# Patient Record
Sex: FEMALE | Race: WHITE | NOT HISPANIC OR LATINO | Employment: PART TIME | URBAN - NONMETROPOLITAN AREA
[De-identification: names, ages, dates, MRNs, and addresses within clinical notes are randomized per-mention and may not be internally consistent; named-entity substitution may affect disease eponyms.]

---

## 2019-05-07 ENCOUNTER — OFFICE VISIT (OUTPATIENT)
Dept: FAMILY MEDICINE CLINIC | Facility: CLINIC | Age: 24
End: 2019-05-07

## 2019-05-07 VITALS
DIASTOLIC BLOOD PRESSURE: 62 MMHG | HEART RATE: 110 BPM | SYSTOLIC BLOOD PRESSURE: 116 MMHG | OXYGEN SATURATION: 96 % | HEIGHT: 66 IN | BODY MASS INDEX: 20.39 KG/M2 | RESPIRATION RATE: 16 BRPM | TEMPERATURE: 97 F | WEIGHT: 126.9 LBS

## 2019-05-07 DIAGNOSIS — Z13.220 SCREENING FOR HYPERLIPIDEMIA: ICD-10-CM

## 2019-05-07 DIAGNOSIS — K59.09 CHRONIC CONSTIPATION: Primary | ICD-10-CM

## 2019-05-07 DIAGNOSIS — Z13.29 SCREENING FOR THYROID DISORDER: ICD-10-CM

## 2019-05-07 DIAGNOSIS — Z79.899 LONG-TERM USE OF HIGH-RISK MEDICATION: ICD-10-CM

## 2019-05-07 PROCEDURE — 99213 OFFICE O/P EST LOW 20 MIN: CPT | Performed by: NURSE PRACTITIONER

## 2019-05-07 RX ORDER — POLYETHYLENE GLYCOL 3350 17 G/17G
17 POWDER, FOR SOLUTION ORAL DAILY
Qty: 765 G | Refills: 11 | Status: SHIPPED | OUTPATIENT
Start: 2019-05-07 | End: 2019-07-08

## 2019-05-07 NOTE — PROGRESS NOTES
Chief Complaint   Patient presents with   • Establish Care     Bowel issues     Subjective   Kate Baeza is a 23 y.o. female who presents to the office to establish care and for current issues with bowels.    The following portions of the patient's history were reviewed and updated as appropriate: allergies, current medications, past family history, past medical history, past social history, past surgical history and problem list.    Migraine    This is a chronic problem. The current episode started more than 1 year ago. Episode frequency: 4 times weekly. The pain is located in the retro-orbital and bilateral region. The pain quality is similar to prior headaches. The quality of the pain is described as sharp, stabbing and band-like. The pain is at a severity of 0/10. The pain is moderate. Associated symptoms include eye pain, nausea, phonophobia, photophobia and vomiting. Pertinent negatives include no coughing, fever, rhinorrhea or scalp tenderness. The symptoms are aggravated by weather changes, bright light and caffeine withdrawal (certain smells). She has tried acetaminophen, beta blockers, darkened room, Excedrin and NSAIDs for the symptoms. The treatment provided no relief. Her past medical history is significant for migraine headaches and migraines in the family.    Previously worked up by neurology in [place] - Dr Shabana Bennett. Failed topirimate. Cannot remember other meds she has tried and failed, but nothing he tried helpd so she quit going to him. Will send for records and she will consider further treatment and referral to headache clinic.     Past Medical History:   Diagnosis Date   • Allergic    • Anemia    • Anxiety    • Asthma    • Headache    • Substance abuse (CMS/[facility])           Family History   Problem Relation Age of Onset   • Hyperlipidemia Mother    • Depression Mother    • Cancer Maternal Grandmother    • COPD Maternal Grandmother         Review of Systems   Constitutional: Negative.   "Negative for fever and unexpected weight change.   HENT: Negative for rhinorrhea.    Eyes: Positive for photophobia and pain.   Respiratory: Negative.  Negative for cough, chest tightness and shortness of breath.    Cardiovascular: Negative.  Negative for chest pain.   Gastrointestinal: Positive for nausea and vomiting.   Endocrine: Negative.    Genitourinary: Negative.  Negative for dysuria.   Musculoskeletal: Negative.    Skin: Negative.  Negative for color change, pallor, rash and wound.   Allergic/Immunologic: Negative.    Neurological: Positive for headaches (migranes x 8 years, prev seen by neurology).   Hematological: Negative.    Psychiatric/Behavioral: Negative.  Negative for sleep disturbance and suicidal ideas.   All other systems reviewed and are negative.      Objective   Vitals:    05/07/19 1406   BP: 116/62   BP Location: Left arm   Patient Position: Sitting   Cuff Size: Adult   Pulse: 110   Resp: 16   Temp: 97 °F (36.1 °C)   SpO2: 96%   Weight: 57.6 kg (126 lb 14.4 oz)   Height: 167.6 cm (66\")   PainSc: 0-No pain     Physical Exam   Constitutional: She is oriented to person, place, and time. She appears well-developed and well-nourished.   HENT:   Head: Normocephalic and atraumatic.   Eyes: Conjunctivae are normal. Pupils are equal, round, and reactive to light.   Neck: Normal range of motion. Neck supple.   Cardiovascular: Normal rate, regular rhythm, normal heart sounds and intact distal pulses. Exam reveals no gallop and no friction rub.   No murmur heard.  Pulmonary/Chest: Effort normal and breath sounds normal. No respiratory distress. She has no wheezes. She has no rales. She exhibits no tenderness.   Abdominal: Soft. Bowel sounds are normal.   Musculoskeletal: Normal range of motion. She exhibits no edema, tenderness or deformity.   Lymphadenopathy:     She has no cervical adenopathy.   Neurological: She is alert and oriented to person, place, and time.   Skin: Skin is warm and dry. Capillary " refill takes 2 to 3 seconds. No erythema. No pallor.   Psychiatric: She has a normal mood and affect. Her behavior is normal. Judgment and thought content normal.   Nursing note and vitals reviewed.      Assessment/Plan   Kate was seen today for establish care.    Diagnoses and all orders for this visit:    Chronic constipation    Other orders  -     polyethylene glycol (MIRALAX) powder; Take 17 g by mouth Daily. Mix in 8 oz liquid daily           No flowsheet data found.    KARY Miller         Return in about 2 years (around 5/7/2021).    There are no Patient Instructions on file for this visit.

## 2019-05-17 ENCOUNTER — TELEPHONE (OUTPATIENT)
Dept: FAMILY MEDICINE CLINIC | Facility: CLINIC | Age: 24
End: 2019-05-17

## 2019-05-17 LAB
ALBUMIN SERPL-MCNC: 4.6 G/DL (ref 3.5–5)
ALBUMIN/GLOB SERPL: 1.4 G/DL (ref 1.1–1.8)
ALP SERPL-CCNC: 48 U/L (ref 38–126)
ALT SERPL W P-5'-P-CCNC: 14 U/L
ANION GAP SERPL CALCULATED.3IONS-SCNC: 12 MMOL/L (ref 5–15)
AST SERPL-CCNC: 16 U/L (ref 14–36)
BASOPHILS # BLD AUTO: 0.03 10*3/MM3 (ref 0–0.2)
BASOPHILS NFR BLD AUTO: 0.3 % (ref 0–1.5)
BILIRUB SERPL-MCNC: 0.8 MG/DL (ref 0.2–1.3)
BUN BLD-MCNC: 9 MG/DL (ref 7–23)
BUN/CREAT SERPL: 17 (ref 7–25)
CALCIUM SPEC-SCNC: 9.8 MG/DL (ref 8.4–10.2)
CHLORIDE SERPL-SCNC: 104 MMOL/L (ref 101–112)
CHOLEST SERPL-MCNC: 143 MG/DL (ref 150–200)
CO2 SERPL-SCNC: 25 MMOL/L (ref 22–30)
CREAT BLD-MCNC: 0.53 MG/DL (ref 0.52–1.04)
DEPRECATED RDW RBC AUTO: 42.1 FL (ref 37–54)
EOSINOPHIL # BLD AUTO: 0.16 10*3/MM3 (ref 0–0.4)
EOSINOPHIL NFR BLD AUTO: 1.8 % (ref 0.3–6.2)
ERYTHROCYTE [DISTWIDTH] IN BLOOD BY AUTOMATED COUNT: 13.2 % (ref 12.3–15.4)
GFR SERPL CREATININE-BSD FRML MDRD: 143 ML/MIN/1.73 (ref 71–165)
GLOBULIN UR ELPH-MCNC: 3.3 GM/DL (ref 2.3–3.5)
GLUCOSE BLD-MCNC: 94 MG/DL (ref 70–99)
HCT VFR BLD AUTO: 38.4 % (ref 34–46.6)
HDLC SERPL-MCNC: 45 MG/DL (ref 40–59)
HGB BLD-MCNC: 12.9 G/DL (ref 12–15.9)
LDLC SERPL CALC-MCNC: 88 MG/DL
LDLC/HDLC SERPL: 1.95 {RATIO} (ref 0–3.22)
LYMPHOCYTES # BLD AUTO: 2.33 10*3/MM3 (ref 0.7–3.1)
LYMPHOCYTES NFR BLD AUTO: 25.6 % (ref 19.6–45.3)
MCH RBC QN AUTO: 29.7 PG (ref 26.6–33)
MCHC RBC AUTO-ENTMCNC: 33.6 G/DL (ref 31.5–35.7)
MCV RBC AUTO: 88.5 FL (ref 79–97)
MONOCYTES # BLD AUTO: 1.08 10*3/MM3 (ref 0.1–0.9)
MONOCYTES NFR BLD AUTO: 11.9 % (ref 5–12)
NEUTROPHILS # BLD AUTO: 5.49 10*3/MM3 (ref 1.7–7)
NEUTROPHILS NFR BLD AUTO: 60.4 % (ref 42.7–76)
PLATELET # BLD AUTO: 237 10*3/MM3 (ref 140–450)
PMV BLD AUTO: 10.3 FL (ref 6–12)
POTASSIUM BLD-SCNC: 4.2 MMOL/L (ref 3.4–5)
PROT SERPL-MCNC: 7.9 G/DL (ref 6.3–8.6)
RBC # BLD AUTO: 4.34 10*6/MM3 (ref 3.77–5.28)
SODIUM BLD-SCNC: 141 MMOL/L (ref 137–145)
TRIGL SERPL-MCNC: 51 MG/DL
VLDLC SERPL-MCNC: 10.2 MG/DL
WBC NRBC COR # BLD: 9.09 10*3/MM3 (ref 3.4–10.8)

## 2019-05-17 PROCEDURE — 80053 COMPREHEN METABOLIC PANEL: CPT | Performed by: NURSE PRACTITIONER

## 2019-05-17 PROCEDURE — 84439 ASSAY OF FREE THYROXINE: CPT | Performed by: NURSE PRACTITIONER

## 2019-05-17 PROCEDURE — 84443 ASSAY THYROID STIM HORMONE: CPT | Performed by: NURSE PRACTITIONER

## 2019-05-17 PROCEDURE — 85025 COMPLETE CBC W/AUTO DIFF WBC: CPT | Performed by: NURSE PRACTITIONER

## 2019-05-17 PROCEDURE — 84480 ASSAY TRIIODOTHYRONINE (T3): CPT | Performed by: NURSE PRACTITIONER

## 2019-05-17 PROCEDURE — 80061 LIPID PANEL: CPT | Performed by: NURSE PRACTITIONER

## 2019-05-17 NOTE — TELEPHONE ENCOUNTER
----- Message from KARY Talamantes sent at 5/17/2019 11:42 AM CDT -----  Notify pt of normal labs, though the thyroid function tests are still pending, thanks christopher

## 2019-05-18 LAB
T3 SERPL-MCNC: 144 NG/DL (ref 80–200)
T4 FREE SERPL-MCNC: 1.46 NG/DL (ref 0.93–1.7)
TSH SERPL DL<=0.05 MIU/L-ACNC: 0.88 MIU/ML (ref 0.27–4.2)

## 2019-05-24 ENCOUNTER — OFFICE VISIT (OUTPATIENT)
Dept: FAMILY MEDICINE CLINIC | Facility: CLINIC | Age: 24
End: 2019-05-24

## 2019-05-24 VITALS
DIASTOLIC BLOOD PRESSURE: 68 MMHG | BODY MASS INDEX: 19.13 KG/M2 | HEIGHT: 66 IN | OXYGEN SATURATION: 98 % | WEIGHT: 119 LBS | HEART RATE: 100 BPM | RESPIRATION RATE: 16 BRPM | SYSTOLIC BLOOD PRESSURE: 114 MMHG

## 2019-05-24 DIAGNOSIS — G43.709 CHRONIC MIGRAINE WITHOUT AURA WITHOUT STATUS MIGRAINOSUS, NOT INTRACTABLE: ICD-10-CM

## 2019-05-24 DIAGNOSIS — K59.09 CHRONIC CONSTIPATION: Primary | ICD-10-CM

## 2019-05-24 PROCEDURE — 99213 OFFICE O/P EST LOW 20 MIN: CPT | Performed by: NURSE PRACTITIONER

## 2019-05-24 RX ORDER — AMITRIPTYLINE HYDROCHLORIDE 25 MG/1
25 TABLET, FILM COATED ORAL NIGHTLY
Qty: 30 TABLET | Refills: 5 | Status: SHIPPED | OUTPATIENT
Start: 2019-05-24 | End: 2019-07-02

## 2019-05-24 RX ORDER — RIZATRIPTAN BENZOATE 10 MG/1
10 TABLET ORAL ONCE AS NEEDED
Qty: 30 TABLET | Refills: 3 | Status: SHIPPED | OUTPATIENT
Start: 2019-05-24 | End: 2019-07-02

## 2019-05-24 RX ORDER — CLONIDINE HYDROCHLORIDE 0.1 MG/1
0.1 TABLET ORAL NIGHTLY
Qty: 30 TABLET | Refills: 5 | Status: SHIPPED | OUTPATIENT
Start: 2019-05-24 | End: 2019-07-02

## 2019-05-24 NOTE — PROGRESS NOTES
"Chief Complaint   Patient presents with   • Constipation     2 week f/u     Subjective   Kate Baeza is a 23 y.o. female who presents to the office for follow up of constipation and migraine.    The following portions of the patient's history were reviewed and updated as appropriate: allergies, current medications, past family history, past medical history, past social history, past surgical history and problem list.    Constipation  Has previously tried laxatives, stool softeners, fiber with no improvement in symptoms. Has tried dietary changes and increasing fluid intake. She does take Suboxone but states that there was no change in symptoms with that. Started on Miralax on 5/7/19, taking twice daily. States that she is now moving her bowels twice a day with a \"normal\" bowel movement every couple days.  States that she now has some loose stool when she urinates. She has been taking the Miralax twice a day every day.  Encouraged to reduce frequency of Miralax to once a day or every other day.  Offered referral to GI, states she would like to hold off at this time and see how she does with Miralax for at least a month.       Migraine    This is a chronic problem. The current episode started more than 1 year ago. Episode frequency: 4 times weekly. The pain is located in the retro-orbital and bilateral region. The pain quality is similar to prior headaches. The quality of the pain is described as sharp, stabbing and band-like. The pain is at a severity of 0/10. The pain is moderate. Associated symptoms include eye pain, nausea, phonophobia, photophobia and vomiting. Pertinent negatives include no coughing, fever, rhinorrhea or scalp tenderness. The symptoms are aggravated by weather changes, bright light and caffeine withdrawal (certain smells). She has tried acetaminophen, beta blockers, darkened room, Excedrin and NSAIDs for the symptoms. The treatment provided no relief. Her past medical history is significant " "for migraine headaches and migraines in the family.    Previously worked up by neurology in [place] - Dr Shabana Bennett. Failed topirimate. Cannot remember other meds she has tried and failed, but nothing he tried helpd so she quit going to him. Will send for records and she will consider further treatment and referral to headache clinic.      Past Medical History:   Diagnosis Date   • Allergic    • Anemia    • Anxiety    • Asthma    • Headache    • Substance abuse (CMS/HCC)           Family History   Problem Relation Age of Onset   • Hyperlipidemia Mother    • Depression Mother    • Cancer Maternal Grandmother    • COPD Maternal Grandmother         Review of Systems   Constitutional: Negative.  Negative for fever and unexpected weight change.   Eyes: Negative.    Respiratory: Negative.  Negative for cough, chest tightness and shortness of breath.    Cardiovascular: Negative.  Negative for chest pain.   Gastrointestinal: Positive for abdominal pain (\"bubbling\" since starting Miralax), constipation (chronic), diarrhea (since initiation of Miralax) and vomiting (once, yesterday).   Endocrine: Negative.    Genitourinary: Negative.  Negative for dysuria.   Musculoskeletal: Negative.    Skin: Negative.  Negative for color change, pallor, rash and wound.   Allergic/Immunologic: Negative.    Neurological: Positive for headaches.   Hematological: Negative.    Psychiatric/Behavioral: Negative.  Negative for sleep disturbance and suicidal ideas.       Objective   Vitals:    05/24/19 1435   BP: 114/68   Pulse: 100   Resp: 16   SpO2: 98%   Weight: 54 kg (119 lb)   Height: 167.6 cm (66\")     Physical Exam   Constitutional: She is oriented to person, place, and time. She appears well-developed and well-nourished.   HENT:   Head: Normocephalic and atraumatic.   Eyes: Conjunctivae are normal. Pupils are equal, round, and reactive to light.   Neck: Normal range of motion. Neck supple.   Cardiovascular: Normal rate, regular " rhythm, normal heart sounds and intact distal pulses. Exam reveals no gallop and no friction rub.   No murmur heard.  Pulmonary/Chest: Effort normal and breath sounds normal. No respiratory distress. She has no wheezes. She has no rales. She exhibits no tenderness.   Abdominal: Soft. Bowel sounds are normal.   Musculoskeletal: Normal range of motion. She exhibits no edema, tenderness or deformity.   Lymphadenopathy:     She has no cervical adenopathy.   Neurological: She is alert and oriented to person, place, and time.   Skin: Skin is warm and dry. Capillary refill takes 2 to 3 seconds. No erythema. No pallor.   Psychiatric: She has a normal mood and affect. Her behavior is normal. Judgment and thought content normal.   Nursing note and vitals reviewed.      Assessment/Plan   Kate was seen today for constipation.    Diagnoses and all orders for this visit:    Chronic constipation  Comments:  Started on Miralax on 5/7/19 with resolution of constipation. Declines referral to GI this date.    Chronic migraine without aura without status migrainosus, not intractable  -     amitriptyline (ELAVIL) 25 MG tablet; Take 1 tablet by mouth Every Night. For chronic headache prevention  -     rizatriptan (MAXALT) 10 MG tablet; Take 1 tablet by mouth 1 (One) Time As Needed for Migraine for up to 1 dose. May repeat in 2 hours if needed max 30mg in 24 hours  -     CloNIDine (CATAPRES) 0.1 MG tablet; Take 1 tablet by mouth Every Night. For sleep/ headaches/ anxiety           No flowsheet data found.    KARY Miller         Return in about 4 weeks (around 6/21/2019).    Patient Instructions   Reduce Miralax to once a day. Hold a dose if you are having diarrhea or frequent loose stool.       Office Visit on 05/07/2019   Component Date Value Ref Range Status   • Glucose 05/17/2019 94  70 - 99 mg/dL Final   • BUN 05/17/2019 9  7 - 23 mg/dL Final   • Creatinine 05/17/2019 0.53  0.52 - 1.04 mg/dL Final   • Sodium 05/17/2019 141   137 - 145 mmol/L Final   • Potassium 05/17/2019 4.2  3.4 - 5.0 mmol/L Final   • Chloride 05/17/2019 104  101 - 112 mmol/L Final   • CO2 05/17/2019 25.0  22.0 - 30.0 mmol/L Final   • Calcium 05/17/2019 9.8  8.4 - 10.2 mg/dL Final   • Total Protein 05/17/2019 7.9  6.3 - 8.6 g/dL Final   • Albumin 05/17/2019 4.60  3.50 - 5.00 g/dL Final   • ALT (SGPT) 05/17/2019 14  <=35 U/L Final   • AST (SGOT) 05/17/2019 16  14 - 36 U/L Final   • Alkaline Phosphatase 05/17/2019 48  38 - 126 U/L Final   • Total Bilirubin 05/17/2019 0.8  0.2 - 1.3 mg/dL Final   • eGFR Non African Amer 05/17/2019 143  71 - 165 mL/min/1.73 Final   • Globulin 05/17/2019 3.3  2.3 - 3.5 gm/dL Final   • A/G Ratio 05/17/2019 1.4  1.1 - 1.8 g/dL Final   • BUN/Creatinine Ratio 05/17/2019 17.0  7.0 - 25.0 Final   • Anion Gap 05/17/2019 12.0  5.0 - 15.0 mmol/L Final   • Total Cholesterol 05/17/2019 143* 150 - 200 mg/dL Final   • Triglycerides 05/17/2019 51  <=150 mg/dL Final   • HDL Cholesterol 05/17/2019 45  40 - 59 mg/dL Final   • LDL Cholesterol  05/17/2019 88  <=100 mg/dL Final   • VLDL Cholesterol 05/17/2019 10.2  mg/dL Final   • LDL/HDL Ratio 05/17/2019 1.95  0.00 - 3.22 Final   • Free T4 05/17/2019 1.46  0.93 - 1.70 ng/dL Final   • TSH 05/17/2019 0.876  0.270 - 4.200 mIU/mL Final   • T3, Total 05/17/2019 144.0  80.0 - 200.0 ng/dl Final   • WBC 05/17/2019 9.09  3.40 - 10.80 10*3/mm3 Final   • RBC 05/17/2019 4.34  3.77 - 5.28 10*6/mm3 Final   • Hemoglobin 05/17/2019 12.9  12.0 - 15.9 g/dL Final   • Hematocrit 05/17/2019 38.4  34.0 - 46.6 % Final   • MCV 05/17/2019 88.5  79.0 - 97.0 fL Final   • MCH 05/17/2019 29.7  26.6 - 33.0 pg Final   • MCHC 05/17/2019 33.6  31.5 - 35.7 g/dL Final   • RDW 05/17/2019 13.2  12.3 - 15.4 % Final   • RDW-SD 05/17/2019 42.1  37.0 - 54.0 fl Final   • MPV 05/17/2019 10.3  6.0 - 12.0 fL Final   • Platelets 05/17/2019 237  140 - 450 10*3/mm3 Final   • Neutrophil % 05/17/2019 60.4  42.7 - 76.0 % Final   • Lymphocyte % 05/17/2019  25.6  19.6 - 45.3 % Final   • Monocyte % 05/17/2019 11.9  5.0 - 12.0 % Final   • Eosinophil % 05/17/2019 1.8  0.3 - 6.2 % Final   • Basophil % 05/17/2019 0.3  0.0 - 1.5 % Final   • Neutrophils, Absolute 05/17/2019 5.49  1.70 - 7.00 10*3/mm3 Final   • Lymphocytes, Absolute 05/17/2019 2.33  0.70 - 3.10 10*3/mm3 Final   • Monocytes, Absolute 05/17/2019 1.08* 0.10 - 0.90 10*3/mm3 Final   • Eosinophils, Absolute 05/17/2019 0.16  0.00 - 0.40 10*3/mm3 Final   • Basophils, Absolute 05/17/2019 0.03  0.00 - 0.20 10*3/mm3 Final   ]

## 2019-07-01 ENCOUNTER — INITIAL PRENATAL (OUTPATIENT)
Dept: OBSTETRICS AND GYNECOLOGY | Facility: CLINIC | Age: 24
End: 2019-07-01

## 2019-07-01 ENCOUNTER — APPOINTMENT (OUTPATIENT)
Dept: LAB | Facility: HOSPITAL | Age: 24
End: 2019-07-01

## 2019-07-01 VITALS — BODY MASS INDEX: 20.14 KG/M2 | DIASTOLIC BLOOD PRESSURE: 68 MMHG | WEIGHT: 121 LBS | SYSTOLIC BLOOD PRESSURE: 102 MMHG

## 2019-07-01 DIAGNOSIS — Z3A.12 12 WEEKS GESTATION OF PREGNANCY: Primary | ICD-10-CM

## 2019-07-01 DIAGNOSIS — F19.91 HISTORY OF DRUG USE: ICD-10-CM

## 2019-07-01 DIAGNOSIS — K59.00 CONSTIPATION IN PREGNANCY IN FIRST TRIMESTER: ICD-10-CM

## 2019-07-01 DIAGNOSIS — O99.611 CONSTIPATION IN PREGNANCY IN FIRST TRIMESTER: ICD-10-CM

## 2019-07-01 DIAGNOSIS — Z86.69 HISTORY OF MIGRAINE HEADACHES: ICD-10-CM

## 2019-07-01 DIAGNOSIS — Z36.87 ENCOUNTER FOR ANTENATAL SCREENING FOR UNCERTAIN DATES: ICD-10-CM

## 2019-07-01 DIAGNOSIS — F17.210 CIGARETTE SMOKER: ICD-10-CM

## 2019-07-01 DIAGNOSIS — R11.0 NAUSEA: ICD-10-CM

## 2019-07-01 DIAGNOSIS — Z34.80 PRENATAL CARE OF MULTIGRAVIDA, ANTEPARTUM: Primary | ICD-10-CM

## 2019-07-01 DIAGNOSIS — F19.11 HX OF SUBSTANCE ABUSE (HCC): ICD-10-CM

## 2019-07-01 DIAGNOSIS — R42 DIZZY: ICD-10-CM

## 2019-07-01 DIAGNOSIS — O09.299 HISTORY OF MISCARRIAGE, CURRENTLY PREGNANT: ICD-10-CM

## 2019-07-01 DIAGNOSIS — O99.331 MATERNAL TOBACCO USE IN FIRST TRIMESTER: ICD-10-CM

## 2019-07-01 LAB
ABO GROUP BLD: NORMAL
AMPHET+METHAMPHET UR QL: POSITIVE
BARBITURATES UR QL SCN: NEGATIVE
BASOPHILS # BLD AUTO: 0.05 10*3/MM3 (ref 0–0.2)
BASOPHILS NFR BLD AUTO: 0.6 % (ref 0–1.5)
BENZODIAZ UR QL SCN: NEGATIVE
BILIRUB UR QL STRIP: NEGATIVE
BLD GP AB SCN SERPL QL: NEGATIVE
CANNABINOIDS SERPL QL: NEGATIVE
CLARITY UR: ABNORMAL
COCAINE UR QL: NEGATIVE
COLOR UR: ABNORMAL
DEPRECATED RDW RBC AUTO: 41.5 FL (ref 37–54)
EOSINOPHIL # BLD AUTO: 0.14 10*3/MM3 (ref 0–0.4)
EOSINOPHIL NFR BLD AUTO: 1.5 % (ref 0.3–6.2)
ERYTHROCYTE [DISTWIDTH] IN BLOOD BY AUTOMATED COUNT: 13 % (ref 12.3–15.4)
GLUCOSE UR STRIP-MCNC: NEGATIVE MG/DL
HBV SURFACE AG SERPL QL IA: NORMAL
HCT VFR BLD AUTO: 35.7 % (ref 34–46.6)
HCV AB SER DONR QL: NORMAL
HGB BLD-MCNC: 12.1 G/DL (ref 12–15.9)
HGB UR QL STRIP.AUTO: NEGATIVE
HIV1+2 AB SER QL: NORMAL
IMM GRANULOCYTES # BLD AUTO: 0.05 10*3/MM3 (ref 0–0.05)
IMM GRANULOCYTES NFR BLD AUTO: 0.6 % (ref 0–0.5)
KETONES UR QL STRIP: ABNORMAL
LEUKOCYTE ESTERASE UR QL STRIP.AUTO: ABNORMAL
LYMPHOCYTES # BLD AUTO: 1.55 10*3/MM3 (ref 0.7–3.1)
LYMPHOCYTES NFR BLD AUTO: 17.1 % (ref 19.6–45.3)
Lab: NORMAL
MCH RBC QN AUTO: 29.8 PG (ref 26.6–33)
MCHC RBC AUTO-ENTMCNC: 33.9 G/DL (ref 31.5–35.7)
MCV RBC AUTO: 87.9 FL (ref 79–97)
METHADONE UR QL SCN: NEGATIVE
MONOCYTES # BLD AUTO: 0.73 10*3/MM3 (ref 0.1–0.9)
MONOCYTES NFR BLD AUTO: 8.1 % (ref 5–12)
NEUTROPHILS # BLD AUTO: 6.52 10*3/MM3 (ref 1.7–7)
NEUTROPHILS NFR BLD AUTO: 72.1 % (ref 42.7–76)
NITRITE UR QL STRIP: NEGATIVE
NRBC BLD AUTO-RTO: 0 /100 WBC (ref 0–0.2)
OPIATES UR QL: NEGATIVE
OXYCODONE UR QL SCN: NEGATIVE
PH UR STRIP.AUTO: 6.5 [PH] (ref 5–8)
PLATELET # BLD AUTO: 200 10*3/MM3 (ref 140–450)
PMV BLD AUTO: 10.3 FL (ref 6–12)
PROT UR QL STRIP: ABNORMAL
RBC # BLD AUTO: 4.06 10*6/MM3 (ref 3.77–5.28)
RH BLD: POSITIVE
RPR SER QL: NORMAL
RUBV IGG SERPL IA-ACNC: NORMAL
SP GR UR STRIP: >1.03 (ref 1–1.03)
UROBILINOGEN UR QL STRIP: ABNORMAL
WBC NRBC COR # BLD: 9.04 10*3/MM3 (ref 3.4–10.8)

## 2019-07-01 PROCEDURE — 87086 URINE CULTURE/COLONY COUNT: CPT | Performed by: NURSE PRACTITIONER

## 2019-07-01 PROCEDURE — 99214 OFFICE O/P EST MOD 30 MIN: CPT | Performed by: NURSE PRACTITIONER

## 2019-07-01 PROCEDURE — 80307 DRUG TEST PRSMV CHEM ANLYZR: CPT | Performed by: NURSE PRACTITIONER

## 2019-07-01 PROCEDURE — 81003 URINALYSIS AUTO W/O SCOPE: CPT | Performed by: NURSE PRACTITIONER

## 2019-07-01 PROCEDURE — 87661 TRICHOMONAS VAGINALIS AMPLIF: CPT | Performed by: NURSE PRACTITIONER

## 2019-07-01 PROCEDURE — 36415 COLL VENOUS BLD VENIPUNCTURE: CPT

## 2019-07-01 PROCEDURE — 87591 N.GONORRHOEAE DNA AMP PROB: CPT | Performed by: NURSE PRACTITIONER

## 2019-07-01 PROCEDURE — 87491 CHLMYD TRACH DNA AMP PROBE: CPT | Performed by: NURSE PRACTITIONER

## 2019-07-01 PROCEDURE — 86803 HEPATITIS C AB TEST: CPT | Performed by: NURSE PRACTITIONER

## 2019-07-01 PROCEDURE — 80081 OBSTETRIC PANEL INC HIV TSTG: CPT | Performed by: NURSE PRACTITIONER

## 2019-07-01 RX ORDER — DOCUSATE SODIUM 100 MG/1
100 CAPSULE, LIQUID FILLED ORAL 2 TIMES DAILY
Qty: 60 CAPSULE | Refills: 1 | Status: SHIPPED | OUTPATIENT
Start: 2019-07-01 | End: 2019-12-17

## 2019-07-01 RX ORDER — PRENATAL VIT/IRON FUM/FOLIC AC 27MG-0.8MG
TABLET ORAL DAILY
COMMUNITY
End: 2019-07-03 | Stop reason: SDUPTHER

## 2019-07-01 NOTE — PROGRESS NOTES
"I spent approximately 60 minutes with the patient acquiring the health and history intake and discussing topics related to healthy lifestyle. This is her 2nd pregnancy. She had a miscarriage a year ago. Patient has history of anemia and constipation. A paper is given on anemia and on fiber. We discussed options to try for constipation because patient states she does not take anything daily. She says when she cant \"use the bathroom\" she takes miralax. We discussed fiber supplements, stool softeners, water, added fiber in diet, etc. The 1st trimester teaching was done with the patient. We discussed a healthy diet and exercise and what is recommended. I also discussed Listeriosis and Toxoplasmosis and what fish to avoid due to high mercury levels. Informed patient not to be in hot tubs, saunas, or tanning beds. We discussed that spotting may occur after intercourse which is common, but if heavy bleeding like a period occurs to call the [facility] or hospital if clinic is closed.  I encouraged her to make an appointment with the dentist if she has not had a dental exam and cleaning in the last 6 months. A dental letter is given. I  instructed the patient that alcohol, illicit drug use, and tobacco smoking should be avoided in pregnancy. The patient does smoke but trying to quit. She is currently smoking 2-3 cigarettes per day. She has a history of substance abuse. She is currently on suboxone and is seeing Dr. Sagastume at the [facility]. We discussed the hospital policy procedure if a patient has a positive urine drug screen at the time of admission to the hospital. She is unsure if she plans to breastfeed due to medicine she is currently taking. She says it depends on if she is taking suboxone and smoking cigarettes when she delivers. I gave her pamphlet on breastfeeding classes and the breastfeeding mothers support group that is provided by Blossom Desai, Lactation Consultant. We discussed the resources that is " offered at the health departments, and we discussed that some health departments have a breastfeeding peer counselor. I gave her WIC and HANDS pamphlets as well as address and phone number of [facility]. She filled out health dept. Referral form.  I discussed lab tests will be done today. The Quad screen is discussed and informed patient it is offered at 15-20 weeks and is optional. She states she has never had a pap smear.   I encouraged the patient to get the TDAP vaccine in the 3rd trimester. I told the patient that health departments are giving the TDAP vaccine to family members. All questions were answered at this time.

## 2019-07-02 DIAGNOSIS — R82.5 POSITIVE URINE DRUG SCREEN: Primary | ICD-10-CM

## 2019-07-02 LAB
BACTERIA SPEC AEROBE CULT: NO GROWTH
C TRACH RRNA CVX QL NAA+PROBE: NEGATIVE
N GONORRHOEA RRNA SPEC QL NAA+PROBE: NEGATIVE
TRICHOMONAS VAGINALIS PCR: NEGATIVE

## 2019-07-02 NOTE — PROGRESS NOTES
CC: NOB visit, hx reviewed    HPI: Second pregnancy, history of early SAB in 2018.  Pt reports only current medical condition is migraine headaches.  She is taking suboxone daily for past history of substance abuse, but has weaned herself down.  SxHx: wisdom teeth extraction    ROS: +nausea, dizzy spells, constipation.  Pt denies hb, cramping, dysuria, or vaginal bleeding.      Labs:   No results found for: HGBA1C  Glucose   Date Value Ref Range Status   2019 94 70 - 99 mg/dL Final     Last Completed Pap Smear       Status Date      PAP SMEAR No completions recorded          Radiology: v scan utilized     Each of the above were reviewed and integrated into prenatal care plan.    P/E:  See Vitals flow sheet, see NOB physical in episode tab    A/P: 23 y.o. #: 1, Date: 2018, Sex: None, Weight: None, GA: None, Delivery: None, Apgar1: None, Apgar5: None, Living: None, Birth Comments: None    #: 2, Date: None, Sex: None, Weight: None, GA: 12w5d per stated LMP      1. Routine prenatal care   Encourage PNV   SAB precautions   NOB labs drawn- will go over results at next visit     2.    Diagnosis Plan   1. 12 weeks gestation of pregnancy     2. Encounter for  screening for uncertain dates  US Ob Transvaginal   3. Constipation in pregnancy in first trimester  psyllium (METAMUCIL SMOOTH TEXTURE) 28 % packet  Ensure pt is drinking at least 8 cups of water daily, eat fruits and veggies    docusate sodium (COLACE) 100 MG capsule   4. History of migraine headaches  No current medications, may take tylenol as needed   5. History of drug use  Pt currently taking suboxone, has an appointment with Dr. Sagastume and planning on switching to subutex    6. Maternal tobacco use in first trimester  Smokes 2-3 cigarettes/day.  Pt educated on risks of smoking in pregnancy.  Strongly encouraged complete cessation.     7. Nausea       Dry crackers before arising in am, small frequent protein         filled snacks, pt  declines need for medication at this time  8. Dizzy        Ensure pt is staying hydrated, change positions slowly     RTC in 1 week for NADJA nora and TVUS dating scan    At least 50% of this 25 minute pt encounter was spent counseling pt

## 2019-07-03 RX ORDER — PRENATAL VIT/IRON FUM/FOLIC AC 65 MG-1 MG
1 TABLET ORAL DAILY
Qty: 30 EACH | Refills: 12 | Status: SHIPPED | OUTPATIENT
Start: 2019-07-03 | End: 2020-01-09 | Stop reason: SDUPTHER

## 2019-07-08 ENCOUNTER — APPOINTMENT (OUTPATIENT)
Dept: LAB | Facility: HOSPITAL | Age: 24
End: 2019-07-08

## 2019-07-08 ENCOUNTER — ROUTINE PRENATAL (OUTPATIENT)
Dept: OBSTETRICS AND GYNECOLOGY | Facility: CLINIC | Age: 24
End: 2019-07-08

## 2019-07-08 VITALS — BODY MASS INDEX: 21.13 KG/M2 | DIASTOLIC BLOOD PRESSURE: 55 MMHG | WEIGHT: 127 LBS | SYSTOLIC BLOOD PRESSURE: 100 MMHG

## 2019-07-08 DIAGNOSIS — K59.00 CONSTIPATION IN PREGNANCY IN FIRST TRIMESTER: ICD-10-CM

## 2019-07-08 DIAGNOSIS — Z3A.10 10 WEEKS GESTATION OF PREGNANCY: Primary | ICD-10-CM

## 2019-07-08 DIAGNOSIS — F19.91 HISTORY OF DRUG USE: ICD-10-CM

## 2019-07-08 DIAGNOSIS — O99.331 MATERNAL TOBACCO USE IN FIRST TRIMESTER: ICD-10-CM

## 2019-07-08 DIAGNOSIS — O99.611 CONSTIPATION IN PREGNANCY IN FIRST TRIMESTER: ICD-10-CM

## 2019-07-08 PROCEDURE — 99213 OFFICE O/P EST LOW 20 MIN: CPT | Performed by: NURSE PRACTITIONER

## 2019-07-08 PROCEDURE — G0480 DRUG TEST DEF 1-7 CLASSES: HCPCS | Performed by: NURSE PRACTITIONER

## 2019-07-08 NOTE — PROGRESS NOTES
CC: NADJA visit, hx reviewed     HPI: Here for follow up prenatal care.     ROS: No complaints today.  Pt denies cramping, dysuria, or vaginal  bleeding.    Labs:   No results found for: HGBA1C  Glucose   Date Value Ref Range Status   05/17/2019 94 70 - 99 mg/dL Final     Last Completed Pap Smear       Status Date      PAP SMEAR No completions recorded          Radiology: Reviewed preliminary scan report with pt- single interuterine pregnancy, cervix wnl, right ovarian cyst measuring 4.89x3.87x4.0 cm    Each of the above were reviewed and integrated into prenatal care plan.    P/E:  See Vitals flow sheet    A/P: 23 y.o. #: 1, Date: 07/2018, Sex: None, Weight: None, GA: None, Delivery: None, Apgar1: None, Apgar5: None, Living: None, Birth Comments: None    #: 2, Date: None, Sex: None, Weight: None, GA:10w5d per today's dating scan       1. Routine prenatal care   Encourage PNV   SAB precautions   Reviewed NOB lab results with pt UDS + amp/meth, confirmatory test today     2.    Diagnosis Plan   1. 10 weeks gestation of pregnancy     2. History of drug use  On subutex, seeing Dr. Sagastume.  Pt reports she is clean.     3. Constipation in pregnancy in first trimester  Continue colace and metamucil daily.  Need to increase water intake to min 8 cups per day.     4. Tobacco use           Encourage complete cessation, pt aware of risks.    RTC in 4 weeks for NADJA appt or sooner if needed

## 2019-07-12 DIAGNOSIS — Z36.87 ENCOUNTER FOR ANTENATAL SCREENING FOR UNCERTAIN DATES: ICD-10-CM

## 2019-07-14 LAB
AMPHETAMINES UR QL: NEGATIVE
Lab: NORMAL

## 2019-08-05 ENCOUNTER — APPOINTMENT (OUTPATIENT)
Dept: LAB | Facility: HOSPITAL | Age: 24
End: 2019-08-05

## 2019-08-05 ENCOUNTER — ROUTINE PRENATAL (OUTPATIENT)
Dept: OBSTETRICS AND GYNECOLOGY | Facility: CLINIC | Age: 24
End: 2019-08-05

## 2019-08-05 VITALS — WEIGHT: 128 LBS | BODY MASS INDEX: 21.3 KG/M2 | SYSTOLIC BLOOD PRESSURE: 99 MMHG | DIASTOLIC BLOOD PRESSURE: 61 MMHG

## 2019-08-05 DIAGNOSIS — O99.612 CONSTIPATION IN PREGNANCY IN SECOND TRIMESTER: ICD-10-CM

## 2019-08-05 DIAGNOSIS — O99.332 MATERNAL TOBACCO USE IN SECOND TRIMESTER: ICD-10-CM

## 2019-08-05 DIAGNOSIS — Z3A.14 14 WEEKS GESTATION OF PREGNANCY: Primary | ICD-10-CM

## 2019-08-05 DIAGNOSIS — R10.9 ABDOMINAL CRAMPING AFFECTING PREGNANCY: ICD-10-CM

## 2019-08-05 DIAGNOSIS — Z36.89 ENCOUNTER FOR FETAL ANATOMIC SURVEY: ICD-10-CM

## 2019-08-05 DIAGNOSIS — O26.899 ABDOMINAL CRAMPING AFFECTING PREGNANCY: ICD-10-CM

## 2019-08-05 DIAGNOSIS — F19.91 HISTORY OF DRUG USE: ICD-10-CM

## 2019-08-05 DIAGNOSIS — K59.00 CONSTIPATION IN PREGNANCY IN SECOND TRIMESTER: ICD-10-CM

## 2019-08-05 LAB
BILIRUB UR QL STRIP: NEGATIVE
CANDIDA ALBICANS: NEGATIVE
CLARITY UR: ABNORMAL
COLOR UR: YELLOW
GARDNERELLA VAGINALIS: POSITIVE
GLUCOSE UR STRIP-MCNC: NEGATIVE MG/DL
HGB UR QL STRIP.AUTO: NEGATIVE
KETONES UR QL STRIP: NEGATIVE
LEUKOCYTE ESTERASE UR QL STRIP.AUTO: ABNORMAL
NITRITE UR QL STRIP: NEGATIVE
PH UR STRIP.AUTO: 6 [PH] (ref 5–8)
PROT UR QL STRIP: NEGATIVE
SP GR UR STRIP: 1.02 (ref 1–1.03)
T VAGINALIS DNA VAG QL PROBE+SIG AMP: NEGATIVE
UROBILINOGEN UR QL STRIP: ABNORMAL

## 2019-08-05 PROCEDURE — 87480 CANDIDA DNA DIR PROBE: CPT | Performed by: NURSE PRACTITIONER

## 2019-08-05 PROCEDURE — 87510 GARDNER VAG DNA DIR PROBE: CPT | Performed by: NURSE PRACTITIONER

## 2019-08-05 PROCEDURE — 87660 TRICHOMONAS VAGIN DIR PROBE: CPT | Performed by: NURSE PRACTITIONER

## 2019-08-05 PROCEDURE — 99213 OFFICE O/P EST LOW 20 MIN: CPT | Performed by: NURSE PRACTITIONER

## 2019-08-05 PROCEDURE — 81003 URINALYSIS AUTO W/O SCOPE: CPT | Performed by: NURSE PRACTITIONER

## 2019-08-05 PROCEDURE — 87086 URINE CULTURE/COLONY COUNT: CPT | Performed by: NURSE PRACTITIONER

## 2019-08-05 NOTE — PROGRESS NOTES
CC: NADJA visit, hx reviewed     HPI: 23 y.o.   GA: 14w5d JIM: 2020, by Ultrasound  Here for follow up prenatal care.     ROS: +constipation, cramping.  Pt denies dysuria or vaginal bleeding.      Labs:   No results found for: HGBA1C  Glucose   Date Value Ref Range Status   2019 94 70 - 99 mg/dL Final     Last Completed Pap Smear       Status Date      PAP SMEAR No completions recorded        Each of the above were reviewed and integrated into prenatal care plan.    P/E:  See Vitals flow sheet    1. Routine prenatal care   Encourage PNV   PTL precautions     2.    Diagnosis Plan   1. 14 weeks gestation of pregnancy     2. Abdominal cramping affecting pregnancy  Urinalysis without microscopic (no culture) - Urine, Clean Catch    Urine Culture - Urine, Urine, Clean Catch    Gardnerella vaginalis, Trichomonas vaginalis, Candida albicans, DNA - Swab, Vagina   3. Maternal tobacco use in second trimester  Down to 1- 3 cigarettes per day   4. Encounter for fetal anatomic survey  US Ob Detail Fetal Anatomy Single or First Gestation   5. History of drug use  On subutex    6. Constipation in pregnancy in second trimester  Increase water intake, continue colace, daily metamucil, miralax prn   Increase fibrous food consumption     RTC in 4 weeks for NADJA appt and anatomy scan or sooner if needed

## 2019-08-06 LAB — BACTERIA SPEC AEROBE CULT: NO GROWTH

## 2019-08-06 RX ORDER — FLUCONAZOLE 150 MG/1
TABLET ORAL
Qty: 2 TABLET | Refills: 0 | Status: SHIPPED | OUTPATIENT
Start: 2019-08-06 | End: 2019-09-05

## 2019-08-06 RX ORDER — METRONIDAZOLE 500 MG/1
500 TABLET ORAL 2 TIMES DAILY
Qty: 14 TABLET | Refills: 0 | Status: SHIPPED | OUTPATIENT
Start: 2019-08-06 | End: 2019-08-13

## 2019-08-16 ENCOUNTER — PATIENT MESSAGE (OUTPATIENT)
Dept: FAMILY MEDICINE CLINIC | Facility: CLINIC | Age: 24
End: 2019-08-16

## 2019-09-05 ENCOUNTER — ROUTINE PRENATAL (OUTPATIENT)
Dept: OBSTETRICS AND GYNECOLOGY | Facility: CLINIC | Age: 24
End: 2019-09-05

## 2019-09-05 VITALS — BODY MASS INDEX: 21.8 KG/M2 | DIASTOLIC BLOOD PRESSURE: 57 MMHG | WEIGHT: 131 LBS | SYSTOLIC BLOOD PRESSURE: 99 MMHG

## 2019-09-05 DIAGNOSIS — F11.20 PREGNANCY COMPLICATED BY SUBUTEX MAINTENANCE, ANTEPARTUM (HCC): ICD-10-CM

## 2019-09-05 DIAGNOSIS — O99.320 PREGNANCY COMPLICATED BY SUBUTEX MAINTENANCE, ANTEPARTUM (HCC): ICD-10-CM

## 2019-09-05 DIAGNOSIS — IMO0002 EVALUATE ANATOMY NOT SEEN ON PRIOR SONOGRAM: ICD-10-CM

## 2019-09-05 DIAGNOSIS — O99.330 TOBACCO USE DURING PREGNANCY, ANTEPARTUM: ICD-10-CM

## 2019-09-05 DIAGNOSIS — K59.00 CONSTIPATION IN PREGNANCY IN SECOND TRIMESTER: ICD-10-CM

## 2019-09-05 DIAGNOSIS — Z3A.19 19 WEEKS GESTATION OF PREGNANCY: Primary | ICD-10-CM

## 2019-09-05 DIAGNOSIS — O99.612 CONSTIPATION IN PREGNANCY IN SECOND TRIMESTER: ICD-10-CM

## 2019-09-05 PROCEDURE — 99213 OFFICE O/P EST LOW 20 MIN: CPT | Performed by: NURSE PRACTITIONER

## 2019-09-05 RX ORDER — BUPRENORPHINE HYDROCHLORIDE 8 MG/1
TABLET SUBLINGUAL
COMMUNITY
Start: 2019-07-16 | End: 2019-09-05

## 2019-09-05 NOTE — PROGRESS NOTES
CC: Prenatal visit    Kate Baeza is a 24 y.o.  at 19w1d.  Overall pt is doing well, constipation remains an issue despite increased water intake and colace.  She has taken miralax which did help.  Denies contractions, LOF, or VB.  Reports good FM.    BP 99/57   Wt 59.4 kg (131 lb)   LMP 2019   BMI 21.80 kg/m²       FHT: 153 bpm      A/P: Kate Baeza is a 24 y.o.  at 19w1d.    - RTC in 3 weeks for NADJA appt and f/u [facility] for sub optimal views      Diagnosis Plan   1. 19 weeks gestation of pregnancy     2. Evaluate anatomy not seen on prior sonogram  US Ob Follow Up Transabdominal Approach   3. Constipation in pregnancy in second trimester  Increase water intake, continue colace and metamucil, take miralax as needed   4. Pregnancy complicated by subutex maintenance, antepartum (CMS/HCC)     5. Tobacco use during pregnancy, antepartum  Pt aware of risks, strongly encouraged complete smoking cessation        KARY Stokes  2019  12:55 PM

## 2019-09-09 DIAGNOSIS — Z36.89 ENCOUNTER FOR FETAL ANATOMIC SURVEY: ICD-10-CM

## 2019-09-26 ENCOUNTER — ROUTINE PRENATAL (OUTPATIENT)
Dept: OBSTETRICS AND GYNECOLOGY | Facility: CLINIC | Age: 24
End: 2019-09-26

## 2019-09-26 ENCOUNTER — APPOINTMENT (OUTPATIENT)
Dept: LAB | Facility: HOSPITAL | Age: 24
End: 2019-09-26

## 2019-09-26 VITALS — BODY MASS INDEX: 22.63 KG/M2 | DIASTOLIC BLOOD PRESSURE: 52 MMHG | WEIGHT: 136 LBS | SYSTOLIC BLOOD PRESSURE: 90 MMHG

## 2019-09-26 DIAGNOSIS — F11.20 PREGNANCY COMPLICATED BY SUBUTEX MAINTENANCE, ANTEPARTUM (HCC): ICD-10-CM

## 2019-09-26 DIAGNOSIS — Z3A.22 22 WEEKS GESTATION OF PREGNANCY: Primary | ICD-10-CM

## 2019-09-26 DIAGNOSIS — Z64.1 MULTIGRAVIDA: ICD-10-CM

## 2019-09-26 DIAGNOSIS — O99.332: ICD-10-CM

## 2019-09-26 DIAGNOSIS — R35.0 URINARY FREQUENCY: ICD-10-CM

## 2019-09-26 DIAGNOSIS — O99.320 PREGNANCY COMPLICATED BY SUBUTEX MAINTENANCE, ANTEPARTUM (HCC): ICD-10-CM

## 2019-09-26 LAB
BILIRUB UR QL STRIP: NEGATIVE
CLARITY UR: CLEAR
COLOR UR: ABNORMAL
GLUCOSE UR STRIP-MCNC: NEGATIVE MG/DL
HGB UR QL STRIP.AUTO: NEGATIVE
KETONES UR QL STRIP: ABNORMAL
LEUKOCYTE ESTERASE UR QL STRIP.AUTO: ABNORMAL
NITRITE UR QL STRIP: NEGATIVE
PH UR STRIP.AUTO: 6.5 [PH] (ref 5–8)
PROT UR QL STRIP: ABNORMAL
SP GR UR STRIP: 1.03 (ref 1–1.03)
UROBILINOGEN UR QL STRIP: ABNORMAL

## 2019-09-26 PROCEDURE — 81003 URINALYSIS AUTO W/O SCOPE: CPT | Performed by: NURSE PRACTITIONER

## 2019-09-26 PROCEDURE — 87086 URINE CULTURE/COLONY COUNT: CPT | Performed by: NURSE PRACTITIONER

## 2019-09-26 PROCEDURE — 99212 OFFICE O/P EST SF 10 MIN: CPT | Performed by: NURSE PRACTITIONER

## 2019-09-26 RX ORDER — BUPRENORPHINE HYDROCHLORIDE 8 MG/1
TABLET SUBLINGUAL
Refills: 0 | COMMUNITY
Start: 2019-09-23 | End: 2020-03-10

## 2019-09-26 NOTE — PROGRESS NOTES
CC: Prenatal visit    Kate Baeza is a 24 y.o.  at 22w1d.  Doing well.  Pt reports she is urinating 5x per hour for the past few days.  Denies contractions, LOF, or VB.  Reports good FM.    BP 90/52   Wt 61.7 kg (136 lb)   LMP 2019   BMI 22.63 kg/m²     Reviewed preliminary scan report with pt- placenta anterior, previous sub optimal views obtained, AFV WNL, CL 3.8cm  Fundal Height (cm): 22 cm  Fetal Heart Rate: 151 us           A/P: Kate Baeza is a 24 y.o.  at 22w1d.  - RTC in 4 weeks for NADJA appt or sooner if needed      Diagnosis Plan   1. 22 weeks gestation of pregnancy     2. Urinary frequency  Urinalysis without microscopic (no culture) - Urine, Clean Catch    Urine Culture - , Urine, Clean Catch   3. Pregnancy complicated by subutex maintenance, antepartum (CMS/HCC)  On subutex, sees Dr. Sagastume at [facility]        Antonieta Jumana, APRJEANNIE  2019  1:47 PM

## 2019-09-27 ENCOUNTER — TELEPHONE (OUTPATIENT)
Dept: OBSTETRICS AND GYNECOLOGY | Facility: CLINIC | Age: 24
End: 2019-09-27

## 2019-09-27 LAB — BACTERIA SPEC AEROBE CULT: NO GROWTH

## 2019-10-04 DIAGNOSIS — IMO0002 EVALUATE ANATOMY NOT SEEN ON PRIOR SONOGRAM: ICD-10-CM

## 2019-10-07 DIAGNOSIS — IMO0002 EVALUATE ANATOMY NOT SEEN ON PRIOR SONOGRAM: Primary | ICD-10-CM

## 2019-10-17 ENCOUNTER — LAB (OUTPATIENT)
Dept: LAB | Facility: HOSPITAL | Age: 24
End: 2019-10-17

## 2019-10-17 DIAGNOSIS — Z36.9 ENCOUNTER FOR ANTENATAL SCREENING: ICD-10-CM

## 2019-10-17 DIAGNOSIS — Z36.9 ENCOUNTER FOR ANTENATAL SCREENING: Primary | ICD-10-CM

## 2019-10-17 DIAGNOSIS — Z3A.25 25 WEEKS GESTATION OF PREGNANCY: ICD-10-CM

## 2019-10-17 LAB
BASOPHILS # BLD AUTO: 0.05 10*3/MM3 (ref 0–0.2)
BASOPHILS NFR BLD AUTO: 0.4 % (ref 0–1.5)
DEPRECATED RDW RBC AUTO: 45 FL (ref 37–54)
EOSINOPHIL # BLD AUTO: 0.14 10*3/MM3 (ref 0–0.4)
EOSINOPHIL NFR BLD AUTO: 1.1 % (ref 0.3–6.2)
ERYTHROCYTE [DISTWIDTH] IN BLOOD BY AUTOMATED COUNT: 13.2 % (ref 12.3–15.4)
GLUCOSE 1H P 100 G GLC PO SERPL-MCNC: 100 MG/DL (ref 60–140)
HCT VFR BLD AUTO: 31.9 % (ref 34–46.6)
HGB BLD-MCNC: 10.8 G/DL (ref 12–15.9)
IMM GRANULOCYTES # BLD AUTO: 0.07 10*3/MM3 (ref 0–0.05)
IMM GRANULOCYTES NFR BLD AUTO: 0.6 % (ref 0–0.5)
LYMPHOCYTES # BLD AUTO: 1.28 10*3/MM3 (ref 0.7–3.1)
LYMPHOCYTES NFR BLD AUTO: 10.3 % (ref 19.6–45.3)
MCH RBC QN AUTO: 31.5 PG (ref 26.6–33)
MCHC RBC AUTO-ENTMCNC: 33.9 G/DL (ref 31.5–35.7)
MCV RBC AUTO: 93 FL (ref 79–97)
MONOCYTES # BLD AUTO: 1.11 10*3/MM3 (ref 0.1–0.9)
MONOCYTES NFR BLD AUTO: 9 % (ref 5–12)
NEUTROPHILS # BLD AUTO: 9.73 10*3/MM3 (ref 1.7–7)
NEUTROPHILS NFR BLD AUTO: 78.6 % (ref 42.7–76)
NRBC BLD AUTO-RTO: 0 /100 WBC (ref 0–0.2)
PLATELET # BLD AUTO: 208 10*3/MM3 (ref 140–450)
PMV BLD AUTO: 10.7 FL (ref 6–12)
RBC # BLD AUTO: 3.43 10*6/MM3 (ref 3.77–5.28)
WBC NRBC COR # BLD: 12.38 10*3/MM3 (ref 3.4–10.8)

## 2019-10-17 PROCEDURE — 85025 COMPLETE CBC W/AUTO DIFF WBC: CPT

## 2019-10-17 PROCEDURE — 36415 COLL VENOUS BLD VENIPUNCTURE: CPT

## 2019-10-17 PROCEDURE — 82950 GLUCOSE TEST: CPT

## 2019-10-23 ENCOUNTER — TELEPHONE (OUTPATIENT)
Dept: OBSTETRICS AND GYNECOLOGY | Facility: CLINIC | Age: 24
End: 2019-10-23

## 2019-10-23 PROBLEM — O09.92 SUPERVISION OF HIGH RISK PREGNANCY IN SECOND TRIMESTER: Status: ACTIVE | Noted: 2019-09-26

## 2019-10-23 PROBLEM — O99.612 CONSTIPATION IN PREGNANCY IN SECOND TRIMESTER: Status: RESOLVED | Noted: 2019-09-05 | Resolved: 2019-10-23

## 2019-10-23 PROBLEM — K59.00 CONSTIPATION IN PREGNANCY IN SECOND TRIMESTER: Status: RESOLVED | Noted: 2019-09-05 | Resolved: 2019-10-23

## 2019-10-23 NOTE — TELEPHONE ENCOUNTER
I called to remind patient of her appointment tomorrow. Left message for her to return my call.     Patient called back and confirmed appointment

## 2019-10-24 ENCOUNTER — ROUTINE PRENATAL (OUTPATIENT)
Dept: OBSTETRICS AND GYNECOLOGY | Facility: CLINIC | Age: 24
End: 2019-10-24

## 2019-10-24 VITALS — DIASTOLIC BLOOD PRESSURE: 60 MMHG | WEIGHT: 139 LBS | SYSTOLIC BLOOD PRESSURE: 102 MMHG | BODY MASS INDEX: 23.13 KG/M2

## 2019-10-24 DIAGNOSIS — Z23 NEED FOR TDAP VACCINATION: ICD-10-CM

## 2019-10-24 DIAGNOSIS — O09.92 SUPERVISION OF HIGH RISK PREGNANCY IN SECOND TRIMESTER: Primary | ICD-10-CM

## 2019-10-24 DIAGNOSIS — O99.320 PREGNANCY COMPLICATED BY SUBUTEX MAINTENANCE, ANTEPARTUM (HCC): ICD-10-CM

## 2019-10-24 DIAGNOSIS — Z23 NEED FOR INFLUENZA VACCINATION: Primary | ICD-10-CM

## 2019-10-24 DIAGNOSIS — Z3A.26 26 WEEKS GESTATION OF PREGNANCY: ICD-10-CM

## 2019-10-24 DIAGNOSIS — F11.20 PREGNANCY COMPLICATED BY SUBUTEX MAINTENANCE, ANTEPARTUM (HCC): ICD-10-CM

## 2019-10-24 DIAGNOSIS — O99.332: ICD-10-CM

## 2019-10-24 DIAGNOSIS — O09.92 SUPERVISION OF HIGH RISK PREGNANCY IN SECOND TRIMESTER: ICD-10-CM

## 2019-10-24 PROCEDURE — 90674 CCIIV4 VAC NO PRSV 0.5 ML IM: CPT | Performed by: OBSTETRICS & GYNECOLOGY

## 2019-10-24 PROCEDURE — 90472 IMMUNIZATION ADMIN EACH ADD: CPT | Performed by: OBSTETRICS & GYNECOLOGY

## 2019-10-24 PROCEDURE — 99213 OFFICE O/P EST LOW 20 MIN: CPT | Performed by: OBSTETRICS & GYNECOLOGY

## 2019-10-24 PROCEDURE — 90715 TDAP VACCINE 7 YRS/> IM: CPT | Performed by: OBSTETRICS & GYNECOLOGY

## 2019-10-24 PROCEDURE — 90471 IMMUNIZATION ADMIN: CPT | Performed by: OBSTETRICS & GYNECOLOGY

## 2019-10-24 NOTE — PROGRESS NOTES
CC: Prenatal visit    Kate Baeza is a 24 y.o.  at 26w1d.  Doing well.  No complaints.  Denies contractions, LOF, or VB.  Reports good FM.    /60   Wt 63 kg (139 lb)   LMP 2019   BMI 23.13 kg/m²   Fundal Height (cm): 26 cm  Fetal Heart Rate: 139     US today- EFW 991g (65%ile), TENISHA 11.1cm, cephalic, placenta anterior, subopt views visualized     Problems (from 19 to present)     Problem Noted Resolved    Supervision of high risk pregnancy in second trimester 2019 by Antonieta Denney APRN No    Tobacco use, maternal, second trimester 2019 by Antonieta Denney APRN No    Pregnancy complicated by subutex maintenance, antepartum (CMS/HCC) 2019 by Antonieta Denney APRN No    Overview Signed 10/24/2019  5:29 PM by Cookie Villa MD     Subutex 8mg TID, prescribed by Dr. Mitesh Melton counselor for therapy  Previously used heroin, methamphetamine, narcotics; last use prior to pregnancy; was on Suboxone prior to pregnancy             A/P: Kate Baeza is a 24 y.o.  at 26w1d.  - RTC in 4 weeks  - Passed 3T labs  - Tdap and flu at next visit     Diagnosis Plan   1. Supervision of high risk pregnancy in second trimester  US Ob Follow Up Transabdominal Approach   2. Tobacco use, maternal, second trimester     3. Pregnancy complicated by subutex maintenance, antepartum (CMS/HCC)     4. 26 weeks gestation of pregnancy  US Ob Follow Up Transabdominal Approach     Cookie Villa MD  10/24/2019  5:30 PM

## 2019-10-24 NOTE — PROGRESS NOTES
Patient is here today for the 1st time in group prenatal education. Approximately 60 minutes were spent with the patient and her partner. She watched the birth video about labor. We discussed labor, timing contractions, comfort measures to help with labor pains, when to come to the hospital, hospital protocol for patients in labor, episiotomy, vacuum extraction, positions for labor, etc.  We also discussed fetal movement kick counts. A pamphlet is given regarding going the full 40 weeks. It also talks about  labor and signs of  labor.  We will plan a tour of L&D at her next group prenatal education appointment. She has an ultrasound today and an appointment with Dr. Villa. She is given her Cent[facility] Pregnancy book with upcoming dates for education.  All questions are answered today.

## 2019-11-05 DIAGNOSIS — IMO0002 EVALUATE ANATOMY NOT SEEN ON PRIOR SONOGRAM: ICD-10-CM

## 2019-11-21 ENCOUNTER — ROUTINE PRENATAL (OUTPATIENT)
Dept: OBSTETRICS AND GYNECOLOGY | Facility: CLINIC | Age: 24
End: 2019-11-21

## 2019-11-21 VITALS — SYSTOLIC BLOOD PRESSURE: 108 MMHG | BODY MASS INDEX: 24.26 KG/M2 | DIASTOLIC BLOOD PRESSURE: 70 MMHG | WEIGHT: 145.8 LBS

## 2019-11-21 DIAGNOSIS — O99.332: ICD-10-CM

## 2019-11-21 DIAGNOSIS — O09.92 SUPERVISION OF HIGH RISK PREGNANCY IN SECOND TRIMESTER: ICD-10-CM

## 2019-11-21 DIAGNOSIS — O99.320 PREGNANCY COMPLICATED BY SUBUTEX MAINTENANCE, ANTEPARTUM (HCC): ICD-10-CM

## 2019-11-21 DIAGNOSIS — O99.333 TOBACCO SMOKING AFFECTING PREGNANCY IN THIRD TRIMESTER: ICD-10-CM

## 2019-11-21 DIAGNOSIS — O09.93 SUPERVISION OF HIGH RISK PREGNANCY IN THIRD TRIMESTER: Primary | ICD-10-CM

## 2019-11-21 DIAGNOSIS — F11.20 PREGNANCY COMPLICATED BY SUBUTEX MAINTENANCE, ANTEPARTUM (HCC): ICD-10-CM

## 2019-11-21 DIAGNOSIS — Z3A.30 30 WEEKS GESTATION OF PREGNANCY: ICD-10-CM

## 2019-11-21 PROCEDURE — 99213 OFFICE O/P EST LOW 20 MIN: CPT | Performed by: OBSTETRICS & GYNECOLOGY

## 2019-11-21 NOTE — PROGRESS NOTES
90 minutes were spent during group prenatal education discussing Epidurals and IV pain medicine use. The “Choices in Childbirth” video was watched today. The video discusses risks and benefits of epidurals. We had discussed labor and delivery last session. More questions were asked regarding labor, delivery, etc.  All questions were answered today. She missed the tour of L&D last session. We did a tour of L&D and more questions were answered on the tour.

## 2019-11-21 NOTE — PROGRESS NOTES
CC: Prenatal visit    Kate Baeza is a 24 y.o.  at 30w1d.  Doing well.  No complaints.  Denies contractions, LOF, or VB.  Reports good FM.    /70   Wt 66.1 kg (145 lb 12.8 oz)   LMP 2019   BMI 24.26 kg/m²   Fundal Height (cm): 30 cm  Fetal Heart Rate: 134    US today- EFW 1642g (58%ile), TENISHA 15.9cm, cephalic, placenta anterior, BPP  Problems (from 19 to present)     Problem Noted Resolved    Supervision of high risk pregnancy in third trimester 2019 by Antonieta Denney APRN No    Tobacco smoking affecting pregnancy in third trimester 2019 by Antonieta Denney APRN No    Pregnancy complicated by subutex maintenance, antepartum (CMS/[facility]) 2019 by Antonieta Denney APRN No    Overview Addendum 10/24/2019  5:31 PM by Cookie Villa MD     Subutex 8mg TID, prescribed by Dr. Mitesh Melton counselor for therapy  Previously used heroin, methamphetamine, narcotics; last use prior to pregnancy; was on Suboxone prior to pregnancy  GS q4wks               A/P: Kate Baeza is a 24 y.o.  at 30w1d.  - RTC in 2 weeks  - GS 4 weeks for Subutex use     Diagnosis Plan   1. Supervision of high risk pregnancy in third trimester     2. Tobacco smoking affecting pregnancy in third trimester     3. Pregnancy complicated by subutex maintenance, antepartum (CMS/HCC)  US Ob Follow Up Transabdominal Approach   4. 30 weeks gestation of pregnancy       Cookie Villa MD  2019  3:35 PM

## 2019-12-02 DIAGNOSIS — Z3A.26 26 WEEKS GESTATION OF PREGNANCY: ICD-10-CM

## 2019-12-02 DIAGNOSIS — O09.92 SUPERVISION OF HIGH RISK PREGNANCY IN SECOND TRIMESTER: ICD-10-CM

## 2019-12-05 ENCOUNTER — ROUTINE PRENATAL (OUTPATIENT)
Dept: OBSTETRICS AND GYNECOLOGY | Facility: CLINIC | Age: 24
End: 2019-12-05

## 2019-12-05 VITALS — WEIGHT: 149.4 LBS | SYSTOLIC BLOOD PRESSURE: 104 MMHG | BODY MASS INDEX: 24.86 KG/M2 | DIASTOLIC BLOOD PRESSURE: 62 MMHG

## 2019-12-05 DIAGNOSIS — O99.333 TOBACCO SMOKING AFFECTING PREGNANCY IN THIRD TRIMESTER: ICD-10-CM

## 2019-12-05 DIAGNOSIS — O09.93 SUPERVISION OF HIGH RISK PREGNANCY IN THIRD TRIMESTER: Primary | ICD-10-CM

## 2019-12-05 DIAGNOSIS — O99.320 PREGNANCY COMPLICATED BY SUBUTEX MAINTENANCE, ANTEPARTUM (HCC): ICD-10-CM

## 2019-12-05 DIAGNOSIS — F11.20 PREGNANCY COMPLICATED BY SUBUTEX MAINTENANCE, ANTEPARTUM (HCC): ICD-10-CM

## 2019-12-05 DIAGNOSIS — O09.93 SUPERVISION OF HIGH RISK PREGNANCY IN THIRD TRIMESTER: ICD-10-CM

## 2019-12-05 DIAGNOSIS — Z3A.32 32 WEEKS GESTATION OF PREGNANCY: ICD-10-CM

## 2019-12-05 PROCEDURE — 99213 OFFICE O/P EST LOW 20 MIN: CPT | Performed by: OBSTETRICS & GYNECOLOGY

## 2019-12-05 NOTE — PROGRESS NOTES
CC: Prenatal visit    Kate Baeza is a 24 y.o.  at 32w1d.  Doing well.  No complaints.  Denies contractions, LOF, or VB.  Reports good FM.    /62   Wt 67.8 kg (149 lb 6.4 oz)   LMP 2019   BMI 24.86 kg/m²   Fundal Height (cm): 32 cm  Fetal Heart Rate: 148     Problems (from 19 to present)     Problem Noted Resolved    Supervision of high risk pregnancy in third trimester 2019 by Antonieta Denney APRN No    Tobacco smoking affecting pregnancy in third trimester 2019 by Antonieta Denney APRN No    Pregnancy complicated by subutex maintenance, antepartum (CMS/[facility]) 2019 by Antonieta Denney APRN No    Overview Addendum 10/24/2019  5:31 PM by Cookie Villa MD     Subutex 8mg TID, prescribed by Dr. Mitesh Melton counselor for therapy  Previously used heroin, methamphetamine, narcotics; last use prior to pregnancy; was on Suboxone prior to pregnancy  GS q4wks             A/P: Kate Baeza is a 24 y.o.  at 32w1d.  - RTC in 2 weeks  - Discussed protocol for infants exposed to Subutex including NICU stay  - GS at next visit     Diagnosis Plan   1. Supervision of high risk pregnancy in third trimester     2. Tobacco smoking affecting pregnancy in third trimester     3. Pregnancy complicated by subutex maintenance, antepartum (CMS/[facility])     4. 32 weeks gestation of pregnancy       Cookie Villa MD  2019  4:53 PM

## 2019-12-05 NOTE — PROGRESS NOTES
Patient is here for #7 group prenatal education. This is the breastfeeding session. Blossom Desai, lactation consultant, joins us. They watch the breastfeeding video. Blossom answers any questions and talks to them about breastfeeding. The patient does plan to breastfeed her baby.

## 2019-12-17 ENCOUNTER — ROUTINE PRENATAL (OUTPATIENT)
Dept: OBSTETRICS AND GYNECOLOGY | Facility: CLINIC | Age: 24
End: 2019-12-17

## 2019-12-17 VITALS — WEIGHT: 149.4 LBS | SYSTOLIC BLOOD PRESSURE: 100 MMHG | BODY MASS INDEX: 24.86 KG/M2 | DIASTOLIC BLOOD PRESSURE: 60 MMHG

## 2019-12-17 DIAGNOSIS — O99.333 TOBACCO SMOKING AFFECTING PREGNANCY IN THIRD TRIMESTER: ICD-10-CM

## 2019-12-17 DIAGNOSIS — O09.93 SUPERVISION OF HIGH RISK PREGNANCY IN THIRD TRIMESTER: Primary | ICD-10-CM

## 2019-12-17 DIAGNOSIS — Z3A.33 33 WEEKS GESTATION OF PREGNANCY: ICD-10-CM

## 2019-12-17 DIAGNOSIS — F11.20 PREGNANCY COMPLICATED BY SUBUTEX MAINTENANCE, ANTEPARTUM (HCC): ICD-10-CM

## 2019-12-17 DIAGNOSIS — O99.320 PREGNANCY COMPLICATED BY SUBUTEX MAINTENANCE, ANTEPARTUM (HCC): ICD-10-CM

## 2019-12-17 PROCEDURE — 99213 OFFICE O/P EST LOW 20 MIN: CPT | Performed by: OBSTETRICS & GYNECOLOGY

## 2019-12-17 NOTE — PROGRESS NOTES
"CC: Prenatal visit    Kate Baeza is a 24 y.o.  at 33w6d.  Doing well.  No complaints.  Denies contractions, LOF, or VB.  Reports good FM.  States that she is only taking 1/4 tablet of Subutex daily.  When asked what she does with the other 3/4 of tablet, she states that \"she just takes it as needed.\"    /60   Wt 67.8 kg (149 lb 6.4 oz)   LMP 2019   BMI 24.86 kg/m²   Fundal Height (cm): 34 cm  Fetal Heart Rate: 143     US today- EFW 2415g (57%ile), TENISHA 16.8cm, cephalic, placenta anterior     Problems (from 19 to present)     Problem Noted Resolved    Supervision of high risk pregnancy in third trimester 2019 by Antonieta Denney, KARY No    Tobacco smoking affecting pregnancy in third trimester 2019 by Antonieta Denney, KARY No    Pregnancy complicated by subutex maintenance, antepartum (CMS/[facility]) 2019 by Antonieta Denney, KARY No    Overview Addendum 10/24/2019  5:31 PM by Cookie Villa MD     Subutex 8mg TID, prescribed by Dr. Mitesh Melton counselor for therapy  Previously used heroin, methamphetamine, narcotics; last use prior to pregnancy; was on Suboxone prior to pregnancy  GS q4wks             A/P: Kate Baeza is a 24 y.o.  at 33w6d.  - RTC in 2 weeks  - GS in 4 weeks     Diagnosis Plan   1. Supervision of high risk pregnancy in third trimester     2. Tobacco smoking affecting pregnancy in third trimester     3. Pregnancy complicated by subutex maintenance, antepartum (CMS/HCC)     4. 33 weeks gestation of pregnancy       Cookie Villa MD  2019  11:30 AM  "

## 2019-12-27 DIAGNOSIS — F11.20 PREGNANCY COMPLICATED BY SUBUTEX MAINTENANCE, ANTEPARTUM (HCC): ICD-10-CM

## 2019-12-27 DIAGNOSIS — O99.320 PREGNANCY COMPLICATED BY SUBUTEX MAINTENANCE, ANTEPARTUM (HCC): ICD-10-CM

## 2020-01-02 ENCOUNTER — ROUTINE PRENATAL (OUTPATIENT)
Dept: OBSTETRICS AND GYNECOLOGY | Facility: CLINIC | Age: 25
End: 2020-01-02

## 2020-01-02 VITALS — WEIGHT: 152 LBS | SYSTOLIC BLOOD PRESSURE: 92 MMHG | DIASTOLIC BLOOD PRESSURE: 58 MMHG | BODY MASS INDEX: 25.29 KG/M2

## 2020-01-02 VITALS — SYSTOLIC BLOOD PRESSURE: 92 MMHG | WEIGHT: 152 LBS | BODY MASS INDEX: 25.29 KG/M2 | DIASTOLIC BLOOD PRESSURE: 58 MMHG

## 2020-01-02 DIAGNOSIS — F11.20 PREGNANCY COMPLICATED BY SUBUTEX MAINTENANCE, ANTEPARTUM (HCC): ICD-10-CM

## 2020-01-02 DIAGNOSIS — O09.93 SUPERVISION OF HIGH RISK PREGNANCY IN THIRD TRIMESTER: Primary | ICD-10-CM

## 2020-01-02 DIAGNOSIS — O09.93 SUPERVISION OF HIGH RISK PREGNANCY IN THIRD TRIMESTER: ICD-10-CM

## 2020-01-02 DIAGNOSIS — O99.320 PREGNANCY COMPLICATED BY SUBUTEX MAINTENANCE, ANTEPARTUM (HCC): ICD-10-CM

## 2020-01-02 DIAGNOSIS — Z3A.36 36 WEEKS GESTATION OF PREGNANCY: ICD-10-CM

## 2020-01-02 DIAGNOSIS — O99.333 TOBACCO SMOKING AFFECTING PREGNANCY IN THIRD TRIMESTER: ICD-10-CM

## 2020-01-02 PROCEDURE — 87653 STREP B DNA AMP PROBE: CPT | Performed by: OBSTETRICS & GYNECOLOGY

## 2020-01-02 PROCEDURE — 99213 OFFICE O/P EST LOW 20 MIN: CPT | Performed by: OBSTETRICS & GYNECOLOGY

## 2020-01-02 NOTE — PROGRESS NOTES
Patient is here today for group prenatal education and has appointment with Dr. Villa. 90 minutes were spent in discussion. We talked about infant care and watched video. She plans to breastfeed and has her breastpump. I encouraged her to attend the breastfeeding class on Tuesday January 7. She is unsure about contraception after delivery. We discussed the options she can use while breastfeeding. I gave her IUD pamphlets and Nexplanon Pamphlet. She is now 36 weeks. We discussed the Group B Strep test and why it is done.  All questions are answered at this time.

## 2020-01-02 NOTE — PROGRESS NOTES
CC: Prenatal visit    Kate Baeza is a 24 y.o.  at 36w1d.  Doing well.  No complaints.  Denies contractions, LOF, or VB.  Reports good FM.    LMP 2019   SVE: ***           Problems (from 19 to present)     Problem Noted Resolved    Supervision of high risk pregnancy in third trimester 2019 by Antonieta Denney APRN No    Tobacco smoking affecting pregnancy in third trimester 2019 by Antonieta Denney APRN No    Pregnancy complicated by subutex maintenance, antepartum (CMS/[facility]) 2019 by Antonieta Denney APRN No    Overview Addendum 10/24/2019  5:31 PM by Cookie Villa MD     Subutex 8mg TID, prescribed by Dr. Mitesh Melton counselor for therapy  Previously used heroin, methamphetamine, narcotics; last use prior to pregnancy; was on Suboxone prior to pregnancy  GS q4wks             A/P: Kate Baeza is a 24 y.o.  at 36w1d.  - RTC in 1 weeks     Diagnosis Plan   1. Supervision of high risk pregnancy in third trimester  Group B Strep (Molecular) - Swab, Vaginal/Rectum   2. Tobacco smoking affecting pregnancy in third trimester     3. Pregnancy complicated by subutex maintenance, antepartum (CMS/HCC)     4. 36 weeks gestation of pregnancy       Cookie Villa MD  2020  1:15 PM

## 2020-01-02 NOTE — PROGRESS NOTES
CC: Prenatal visit    Kate Baeza is a 24 y.o.  at 36w1d.  Doing well.  No complaints.  Denies contractions, LOF, or VB.  Reports good FM.    BP 92/58   Wt 68.9 kg (152 lb)   LMP 2019   BMI 25.29 kg/m²   SVE: FT/thick/high/med/post  Fundal Height (cm): 33 cm  Fetal Heart Rate: 134     Problems (from 19 to present)     Problem Noted Resolved    Supervision of high risk pregnancy in third trimester 2019 by Antonieta Denney APRN No    Tobacco smoking affecting pregnancy in third trimester 2019 by Antonieta Denney APRN No    Pregnancy complicated by subutex maintenance, antepartum (CMS/[facility]) 2019 by Antonieta Denney APRN No    Overview Addendum 10/24/2019  5:31 PM by Cookie Villa MD     Subutex 8mg TID, prescribed by Dr. Mitesh Melton counselor for therapy  Previously used heroin, methamphetamine, narcotics; last use prior to pregnancy; was on Suboxone prior to pregnancy  GS q4wks             A/P: Kate Baeza is a 24 y.o.  at 36w1d.  - RTC in 1 weeks  - Will talk about IOL at next visit     Diagnosis Plan   1. Supervision of high risk pregnancy in third trimester  Group B Strep (Molecular) - Swab, Vaginal/Rectum    Group B Strep (Molecular) - Swab, Vaginal/Rectum   2. Tobacco smoking affecting pregnancy in third trimester     3. Pregnancy complicated by subutex maintenance, antepartum (CMS/[facility])     4. 36 weeks gestation of pregnancy       Cookie Villa MD  2020  2:33 PM

## 2020-01-03 LAB — GROUP B STREP, DNA: NEGATIVE

## 2020-01-09 ENCOUNTER — ROUTINE PRENATAL (OUTPATIENT)
Dept: OBSTETRICS AND GYNECOLOGY | Facility: CLINIC | Age: 25
End: 2020-01-09

## 2020-01-09 ENCOUNTER — TELEPHONE (OUTPATIENT)
Dept: OBSTETRICS AND GYNECOLOGY | Facility: CLINIC | Age: 25
End: 2020-01-09

## 2020-01-09 VITALS — DIASTOLIC BLOOD PRESSURE: 66 MMHG | WEIGHT: 151 LBS | BODY MASS INDEX: 25.13 KG/M2 | SYSTOLIC BLOOD PRESSURE: 102 MMHG

## 2020-01-09 DIAGNOSIS — O99.333 TOBACCO SMOKING AFFECTING PREGNANCY IN THIRD TRIMESTER: ICD-10-CM

## 2020-01-09 DIAGNOSIS — Z3A.37 37 WEEKS GESTATION OF PREGNANCY: ICD-10-CM

## 2020-01-09 DIAGNOSIS — O99.320 PREGNANCY COMPLICATED BY SUBUTEX MAINTENANCE, ANTEPARTUM (HCC): ICD-10-CM

## 2020-01-09 DIAGNOSIS — O09.93 SUPERVISION OF HIGH RISK PREGNANCY IN THIRD TRIMESTER: Primary | ICD-10-CM

## 2020-01-09 DIAGNOSIS — F11.20 PREGNANCY COMPLICATED BY SUBUTEX MAINTENANCE, ANTEPARTUM (HCC): ICD-10-CM

## 2020-01-09 PROCEDURE — 99213 OFFICE O/P EST LOW 20 MIN: CPT | Performed by: OBSTETRICS & GYNECOLOGY

## 2020-01-09 RX ORDER — ONDANSETRON 2 MG/ML
4 INJECTION INTRAMUSCULAR; INTRAVENOUS EVERY 6 HOURS PRN
Status: CANCELLED | OUTPATIENT
Start: 2020-01-09

## 2020-01-09 RX ORDER — SODIUM CHLORIDE, SODIUM LACTATE, POTASSIUM CHLORIDE, CALCIUM CHLORIDE 600; 310; 30; 20 MG/100ML; MG/100ML; MG/100ML; MG/100ML
125 INJECTION, SOLUTION INTRAVENOUS CONTINUOUS
Status: CANCELLED | OUTPATIENT
Start: 2020-01-09

## 2020-01-09 RX ORDER — CARBOPROST TROMETHAMINE 250 UG/ML
250 INJECTION, SOLUTION INTRAMUSCULAR AS NEEDED
Status: CANCELLED | OUTPATIENT
Start: 2020-01-09

## 2020-01-09 RX ORDER — OXYTOCIN 10 [USP'U]/ML
2-20 INJECTION, SOLUTION INTRAMUSCULAR; INTRAVENOUS
Status: CANCELLED | OUTPATIENT
Start: 2020-01-09

## 2020-01-09 RX ORDER — SODIUM CHLORIDE 0.9 % (FLUSH) 0.9 %
3 SYRINGE (ML) INJECTION EVERY 12 HOURS SCHEDULED
Status: CANCELLED | OUTPATIENT
Start: 2020-01-09

## 2020-01-09 RX ORDER — OXYTOCIN 10 [USP'U]/ML
650 INJECTION, SOLUTION INTRAMUSCULAR; INTRAVENOUS ONCE
Status: CANCELLED | OUTPATIENT
Start: 2020-01-09

## 2020-01-09 RX ORDER — PROMETHAZINE HYDROCHLORIDE 25 MG/1
12.5 SUPPOSITORY RECTAL EVERY 6 HOURS PRN
Status: CANCELLED | OUTPATIENT
Start: 2020-01-09

## 2020-01-09 RX ORDER — BUTORPHANOL TARTRATE 1 MG/ML
2 INJECTION, SOLUTION INTRAMUSCULAR; INTRAVENOUS
Status: CANCELLED | OUTPATIENT
Start: 2020-01-09

## 2020-01-09 RX ORDER — PROMETHAZINE HYDROCHLORIDE 25 MG/ML
12.5 INJECTION, SOLUTION INTRAMUSCULAR; INTRAVENOUS
Status: CANCELLED | OUTPATIENT
Start: 2020-01-09

## 2020-01-09 RX ORDER — ACETAMINOPHEN 325 MG/1
1000 TABLET ORAL EVERY 8 HOURS
Status: CANCELLED | OUTPATIENT
Start: 2020-01-09

## 2020-01-09 RX ORDER — SODIUM CHLORIDE 0.9 % (FLUSH) 0.9 %
3-10 SYRINGE (ML) INJECTION AS NEEDED
Status: CANCELLED | OUTPATIENT
Start: 2020-01-09

## 2020-01-09 RX ORDER — PROMETHAZINE HYDROCHLORIDE 25 MG/1
12.5 TABLET ORAL EVERY 6 HOURS PRN
Status: CANCELLED | OUTPATIENT
Start: 2020-01-09

## 2020-01-09 RX ORDER — ONDANSETRON 4 MG/1
4 TABLET, FILM COATED ORAL EVERY 6 HOURS PRN
Status: CANCELLED | OUTPATIENT
Start: 2020-01-09

## 2020-01-09 RX ORDER — PRENATAL VIT/IRON FUM/FOLIC AC 65 MG-1 MG
1 TABLET ORAL DAILY
Qty: 30 EACH | Refills: 12 | Status: SHIPPED | OUTPATIENT
Start: 2020-01-09 | End: 2021-05-13

## 2020-01-09 RX ORDER — ALBUTEROL SULFATE 90 UG/1
1 AEROSOL, METERED RESPIRATORY (INHALATION)
Qty: 6.7 G | Refills: 1 | Status: SHIPPED | OUTPATIENT
Start: 2020-01-09 | End: 2021-06-28

## 2020-01-09 RX ORDER — MISOPROSTOL 100 UG/1
800 TABLET ORAL AS NEEDED
Status: CANCELLED | OUTPATIENT
Start: 2020-01-09

## 2020-01-09 RX ORDER — OXYTOCIN 10 [USP'U]/ML
85 INJECTION, SOLUTION INTRAMUSCULAR; INTRAVENOUS ONCE
Status: CANCELLED | OUTPATIENT
Start: 2020-01-09

## 2020-01-09 RX ORDER — IBUPROFEN 200 MG
800 TABLET ORAL EVERY 8 HOURS
Status: CANCELLED | OUTPATIENT
Start: 2020-01-09

## 2020-01-09 RX ORDER — LIDOCAINE HYDROCHLORIDE 10 MG/ML
5 INJECTION, SOLUTION EPIDURAL; INFILTRATION; INTRACAUDAL; PERINEURAL AS NEEDED
Status: CANCELLED | OUTPATIENT
Start: 2020-01-09

## 2020-01-09 RX ORDER — METHYLERGONOVINE MALEATE 0.2 MG/ML
200 INJECTION INTRAVENOUS ONCE AS NEEDED
Status: CANCELLED | OUTPATIENT
Start: 2020-01-09

## 2020-01-09 NOTE — H&P
"HISTORY & PHYSICAL - Obstetrics  [facility]    Name: Kate Baeza  MRN: [id number]   Date: 2020  CSN: [id number]      CHIEF COMPLAINT:  \"I want to be induced\"    HISTORY OF PRESENT ILLNESS  Kate Baeza is a 24 y.o.  at 37w1d gestational age by 1TUS suggesting Estimated Date of Delivery: 20.  The patient presents for RPN and states that she would like to be induced at 39 weeks.  Denies LOF, vaginal bleeding, or contraction.  Reports good FM.    Patient denies any chest pain, palpitations, headaches, lightheadedness, shortness of breath, cough, nausea, vomiting, diarrhea, constipation, fever, or chills.    ROS  Review of Systems   Constitutional: Negative.    HENT: Negative.    Eyes: Negative.    Respiratory: Negative.    Cardiovascular: Negative.    Gastrointestinal: Negative.    Endocrine: Negative.    Genitourinary: Negative.    Musculoskeletal: Negative.    Skin: Negative.    Allergic/Immunologic: Negative.    Neurological: Negative.    Hematological: Negative.    Psychiatric/Behavioral: Negative.      PRENATAL LAB RESULTS  Prenatal labs reviewed  External Prenatal Results     Pregnancy Outside Results - Transcribed From Office Records - See Scanned Records For Details     Test Value Date Time    Hgb 10.8 g/dL 10/17/19 1427      12.1 g/dL 19 1401      12.9 g/dL 19 0938    Hct 31.9 % 10/17/19 1427      35.7 % 19 1401      38.4 % 19 0938    ABO O  19 1401    Rh Positive  19 1401    Antibody Screen Negative  19 1401    Glucose Fasting GTT       Glucose Tolerance Test 1 hour       Glucose Tolerance Test 3 hour       Gonorrhea (discrete) Negative  19 1401    Chlamydia (discrete) Negative  19 1401    RPR Non-Reactive  19 1401    VDRL       Syphilis Antibody       Rubella Equivocal  19 1401    HBsAg Non-Reactive  19 1401    Herpes Simplex Virus PCR       Herpes Simplex VIrus Culture       HIV Non-Reactive  " 19 1401    Hep C RNA Quant PCR       Hep C Antibody Non-Reactive  19 1401    AFP       Group B Strep Negative  20 1417    GBS Susceptibility to Clindamycin       GBS Susceptibility to Erythromycin       Fetal Fibronectin       Genetic Testing, Maternal Blood             Drug Screening     Test Value Date Time    Urine Drug Screen       Amphetamine Screen       Barbiturate Screen Negative  19 1401    Benzodiazepine Screen Negative  19 1401    Methadone Screen Negative  19 1401    Phencyclidine Screen       Opiates Screen Negative  19 1401    THC Screen Negative  19 1401    Cocaine Screen       Propoxyphene Screen       Buprenorphine Screen       Methamphetamine Screen       Oxycodone Screen Negative  19 1401    Tricyclic Antidepressants Screen                 PRENATAL RISK FACTORS   Problems (from 19 to present)     Problem Noted Resolved    Supervision of high risk pregnancy in third trimester 2019 by Antonieta Denney, KARY No    Tobacco smoking affecting pregnancy in third trimester 2019 by Antonieta Denney APRN No    Pregnancy complicated by subutex maintenance, antepartum (CMS/[facility]) 2019 by Antonieta Denney APRN No    Overview Addendum 10/24/2019  5:31 PM by Cookie Villa MD     Subutex 8mg TID, prescribed by Dr. Mitesh Melton counselor for therapy  Previously used heroin, methamphetamine, narcotics; last use prior to pregnancy; was on Suboxone prior to pregnancy  GS q4wks             OB HISTORY  OB History    Para Term  AB Living   2       1     SAB TAB Ectopic Molar Multiple Live Births   1                # Outcome Date GA Lbr Charanjit/2nd Weight Sex Delivery Anes PTL Lv   2 Current            1 SAB 2018             GYN HISTORY  Denies h/o sexually transmitted infections/pelvic inflammatory disease  Denies h/o gynecologic surgeries, including biopsies of the cervix    PAST  MEDICAL HISTORY  Past Medical History:   Diagnosis Date   • Allergic    • Anemia    • Anxiety    • Asthma    • Headache    • Migraine    • Penicillin allergy    • Smoker    • Substance abuse (CMS/[facility])    • Substance abuse (CMS/[facility])      PAST SURGICAL HISTORY  Past Surgical History:   Procedure Laterality Date   • FACIAL LACERATIONS REPAIR     • MOUTH SURGERY     • WISDOM TOOTH EXTRACTION       FAMILY HISTORY  Family History   Problem Relation Age of Onset   • Depression Mother    • Hypothyroidism Mother    • COPD Maternal Grandmother    • Breast cancer Maternal Grandmother    • Hyperlipidemia Father    • Post-traumatic stress disorder Father    • No Known Problems Sister    • Alcohol abuse Paternal Grandfather    • No Known Problems Maternal Grandfather      SOCIAL HISTORY  Social History     Socioeconomic History   • Marital status: Single     Spouse name: Not on file   • Number of children: Not on file   • Years of education: Not on file   • Highest education level: Not on file   Tobacco Use   • Smoking status: Current Some Day Smoker     Packs/day: 0.25     Years: 10.00     Pack years: 2.50     Types: Cigarettes     Start date: 2007   • Smokeless tobacco: Never Used   • Tobacco comment: smokes 2-3 cigarettes per day    Substance and Sexual Activity   • Alcohol use: No     Frequency: Never   • Drug use: Yes     Types: Heroin     Comment: opiods- now on suboxone, sees Dr. Sagastume at [facility]    • Sexual activity: Yes     Partners: Male     Comment: never had a pap smear      ALLERGIES  Allergies   Allergen Reactions   • Nickel Rash   • Penicillins Unknown - Low Severity     Parents are allergic to it     HOME MEDICATIONS  Prior to Admission medications    Medication Sig Start Date End Date Taking? Authorizing Provider   buprenorphine (SUBUTEX) 8 MG sublingual tablet SL tablet  9/23/19  Yes Provider, MD Kaya   ondansetron ODT (ZOFRAN-ODT) 4 MG disintegrating tablet Take 1 tablet by mouth Every 8 (Eight)  Hours As Needed for Nausea or Vomiting for up to 12 doses. 19  Yes Jeff Montesinos MD   Prenatal Vit-Fe Fumarate-FA (MYNATAL PLUS) tablet Take 1 tablet by mouth Daily. 7/3/19  Yes Antonieta Denney APRN     PHYSICAL EXAM  /66   Wt 68.5 kg (151 lb)   LMP 2019   BMI 25.13 kg/m²   General: No acute distress. Well developed, well nourished. Pleasant.  Heart: Regular rate and rhythm. No murmurs, rubs, or gallops  Lungs: Clear to auscultation bilaterally. No wheezes, rales, or rhonchi.  Abdomen: Soft, nontender to palpation, enlarged by gravid uterus.    YAYA Baeza is a 24 y.o.  at 37w1d scheduled for IOL at 39w0d on .  Will likely need cervical ripening.    PLAN  1.  IUP at 39w0d with EIOL  - Admit: Labor and Delivery  - Attending: Allen  - Diagnosis: EIOL  - Condition: Stable  - Vitals: per protocol  - Activity: ad ana  - Nursing: Continuous electronic fetal monitoring, as per protocol  - Diet: Clears  - IV fluids:  mL/hr  - Allergies: nickel, PCN  - Labs: CBC, T&S, UDS  - GBS: neg.  Antibiotics: N/A  - Kate Baeza and I have discussed pain goals for this hospitalization after reviewing her current clinical condition, medical history and prior pain experiences.  The goal is to keep her pain level appropriate.  Patient does desire an epidural  - Anticipate     This document has been electronically signed by Cookie Villa MD on 2020 1:16 PM.

## 2020-01-09 NOTE — PROGRESS NOTES
CC: Prenatal visit    Kate Baeza is a 24 y.o.  at 37w1d.  Doing well.  No complaints.  Denies contractions, LOF, or VB.  Reports good FM.    /66   Wt 68.5 kg (151 lb)   LMP 2019   BMI 25.13 kg/m²   Fundal Height (cm): 34 cm  Fetal Heart Rate: 150     Problems (from 19 to present)     Problem Noted Resolved    Supervision of high risk pregnancy in third trimester 2019 by Antonieta Denney APRN No    Tobacco smoking affecting pregnancy in third trimester 2019 by Antonieta Denney APRN No    Pregnancy complicated by subutex maintenance, antepartum (CMS/[facility]) 2019 by Antonieta Denney APRN No    Overview Addendum 10/24/2019  5:31 PM by Cookie Villa MD     Subutex 8mg TID, prescribed by Dr. Mitesh Goetzs counselor for therapy  Previously used heroin, methamphetamine, narcotics; last use prior to pregnancy; was on Suboxone prior to pregnancy  GS q4wks             A/P: Kate Baeza is a 24 y.o.  at 37w1d.  - RTC in 1 weeks  - IOL at 39 weeks per patient request on      Diagnosis Plan   1. Supervision of high risk pregnancy in third trimester     2. Tobacco smoking affecting pregnancy in third trimester     3. Pregnancy complicated by subutex maintenance, antepartum (CMS/HCC)     4. 37 weeks gestation of pregnancy       Cookie Villa MD  2020  12:46 PM

## 2020-01-16 ENCOUNTER — ROUTINE PRENATAL (OUTPATIENT)
Dept: OBSTETRICS AND GYNECOLOGY | Facility: CLINIC | Age: 25
End: 2020-01-16

## 2020-01-16 VITALS — BODY MASS INDEX: 24.96 KG/M2 | DIASTOLIC BLOOD PRESSURE: 60 MMHG | SYSTOLIC BLOOD PRESSURE: 108 MMHG | WEIGHT: 150 LBS

## 2020-01-16 DIAGNOSIS — F11.20 PREGNANCY COMPLICATED BY SUBUTEX MAINTENANCE, ANTEPARTUM (HCC): ICD-10-CM

## 2020-01-16 DIAGNOSIS — O26.849 UTERINE SIZE DATE DISCREPANCY PREGNANCY: ICD-10-CM

## 2020-01-16 DIAGNOSIS — O99.320 PREGNANCY COMPLICATED BY SUBUTEX MAINTENANCE, ANTEPARTUM (HCC): ICD-10-CM

## 2020-01-16 DIAGNOSIS — O99.333 TOBACCO SMOKING AFFECTING PREGNANCY IN THIRD TRIMESTER: ICD-10-CM

## 2020-01-16 DIAGNOSIS — Z3A.38 38 WEEKS GESTATION OF PREGNANCY: Primary | ICD-10-CM

## 2020-01-16 DIAGNOSIS — O09.93 SUPERVISION OF HIGH RISK PREGNANCY IN THIRD TRIMESTER: ICD-10-CM

## 2020-01-16 PROCEDURE — 99213 OFFICE O/P EST LOW 20 MIN: CPT | Performed by: NURSE PRACTITIONER

## 2020-01-16 NOTE — PROGRESS NOTES
CC: Prenatal visit    Ktae Baeza is a 24 y.o.  at 38w1d.  Doing well.  Patient reports she had some mild cramping last night for about an hour which relieved spontaneously.   Denies contractions, LOF, or VB.  Reports good FM.    /60   Wt 68 kg (150 lb)   LMP 2019   BMI 24.96 kg/m²   SVE: 0/50/-3  Fundal Height (cm): 35 cm  Fetal Heart Rate: 147 us     Reviewed prelim scan report with pt and s/o- TENISHA 13.59, placenta anterior, BPP 8/8, EFW 7 lb 7 oz @ 58%tile, EFW 39 weeks 7 lb 13 oz     Problems (from 19 to present)     Problem Noted Resolved    Supervision of high risk pregnancy in third trimester 2019 by Antonieta Denney APRN No    Tobacco smoking affecting pregnancy in third trimester 2019 by Antonieta Denney APRN No    Pregnancy complicated by subutex maintenance, antepartum (CMS/[facility]) 2019 by Antonieta Denney APRN No    Overview Addendum 10/24/2019  5:31 PM by Cookie Villa MD     Subutex 8mg TID, prescribed by Dr. Mitesh Melton counselor for therapy  Previously used heroin, methamphetamine, narcotics; last use prior to pregnancy; was on Suboxone prior to pregnancy  GS q4wks               A/P: Kate Baeza is a 24 y.o.  at 38w1d.  - RT L/D on 2020 for IOL or sooner if needed      Diagnosis Plan   1. 38 weeks gestation of pregnancy     2. Supervision of high risk pregnancy in third trimester     3. Tobacco smoking affecting pregnancy in third trimester     4. Pregnancy complicated by subutex maintenance, antepartum (CMS/[facility])     5.       Uterine size/date discrepancy        KARY Stokes  2020  12:21 PM

## 2020-01-25 ENCOUNTER — ANESTHESIA (OUTPATIENT)
Dept: LABOR AND DELIVERY | Facility: HOSPITAL | Age: 25
End: 2020-01-25

## 2020-01-25 ENCOUNTER — ANESTHESIA EVENT (OUTPATIENT)
Dept: LABOR AND DELIVERY | Facility: HOSPITAL | Age: 25
End: 2020-01-25

## 2020-01-25 ENCOUNTER — HOSPITAL ENCOUNTER (OUTPATIENT)
Dept: LABOR AND DELIVERY | Facility: HOSPITAL | Age: 25
Discharge: HOME | End: 2020-01-25

## 2020-01-25 ENCOUNTER — HOSPITAL ENCOUNTER (INPATIENT)
Facility: HOSPITAL | Age: 25
LOS: 3 days | Discharge: HOME | End: 2020-01-28
Attending: OBSTETRICS & GYNECOLOGY | Admitting: OBSTETRICS & GYNECOLOGY

## 2020-01-25 DIAGNOSIS — O09.93 SUPERVISION OF HIGH RISK PREGNANCY IN THIRD TRIMESTER: ICD-10-CM

## 2020-01-25 PROBLEM — Z3A.39 39 WEEKS GESTATION OF PREGNANCY: Status: RESOLVED | Noted: 2020-01-25 | Resolved: 2020-01-25

## 2020-01-25 PROBLEM — O26.849 UTERINE SIZE DATE DISCREPANCY PREGNANCY: Status: RESOLVED | Noted: 2020-01-16 | Resolved: 2020-01-25

## 2020-01-25 PROBLEM — Z3A.39 39 WEEKS GESTATION OF PREGNANCY: Status: ACTIVE | Noted: 2020-01-25

## 2020-01-25 LAB
ABO GROUP BLD: NORMAL
AMPHET+METHAMPHET UR QL: NEGATIVE
AMPHETAMINES UR QL: NEGATIVE
BARBITURATES UR QL SCN: NEGATIVE
BENZODIAZ UR QL SCN: NEGATIVE
BLD GP AB SCN SERPL QL: NEGATIVE
BUPRENORPHINE SERPL-MCNC: POSITIVE NG/ML
CANNABINOIDS SERPL QL: NEGATIVE
COCAINE UR QL: NEGATIVE
DEPRECATED RDW RBC AUTO: 42.7 FL (ref 37–54)
ERYTHROCYTE [DISTWIDTH] IN BLOOD BY AUTOMATED COUNT: 13.1 % (ref 12.3–15.4)
HCT VFR BLD AUTO: 33.9 % (ref 34–46.6)
HGB BLD-MCNC: 11.5 G/DL (ref 12–15.9)
Lab: NORMAL
MCH RBC QN AUTO: 30.4 PG (ref 26.6–33)
MCHC RBC AUTO-ENTMCNC: 33.9 G/DL (ref 31.5–35.7)
MCV RBC AUTO: 89.7 FL (ref 79–97)
METHADONE UR QL SCN: NEGATIVE
OPIATES UR QL: NEGATIVE
OXYCODONE UR QL SCN: NEGATIVE
PCP UR QL SCN: NEGATIVE
PLATELET # BLD AUTO: 169 10*3/MM3 (ref 140–450)
PMV BLD AUTO: 11.6 FL (ref 6–12)
PROPOXYPH UR QL: NEGATIVE
RBC # BLD AUTO: 3.78 10*6/MM3 (ref 3.77–5.28)
RH BLD: POSITIVE
T&S EXPIRATION DATE: NORMAL
TRICYCLICS UR QL SCN: NEGATIVE
WBC NRBC COR # BLD: 10.57 10*3/MM3 (ref 3.4–10.8)

## 2020-01-25 PROCEDURE — 86850 RBC ANTIBODY SCREEN: CPT | Performed by: OBSTETRICS & GYNECOLOGY

## 2020-01-25 PROCEDURE — 85027 COMPLETE CBC AUTOMATED: CPT | Performed by: OBSTETRICS & GYNECOLOGY

## 2020-01-25 PROCEDURE — G0480 DRUG TEST DEF 1-7 CLASSES: HCPCS | Performed by: OBSTETRICS & GYNECOLOGY

## 2020-01-25 PROCEDURE — 86900 BLOOD TYPING SEROLOGIC ABO: CPT | Performed by: OBSTETRICS & GYNECOLOGY

## 2020-01-25 PROCEDURE — C1755 CATHETER, INTRASPINAL: HCPCS | Performed by: NURSE ANESTHETIST, CERTIFIED REGISTERED

## 2020-01-25 PROCEDURE — 80306 DRUG TEST PRSMV INSTRMNT: CPT | Performed by: OBSTETRICS & GYNECOLOGY

## 2020-01-25 PROCEDURE — 86901 BLOOD TYPING SEROLOGIC RH(D): CPT | Performed by: OBSTETRICS & GYNECOLOGY

## 2020-01-25 PROCEDURE — 25010000002 PROMETHAZINE PER 50 MG: Performed by: OBSTETRICS & GYNECOLOGY

## 2020-01-25 PROCEDURE — 59200 INSERT CERVICAL DILATOR: CPT | Performed by: OBSTETRICS & GYNECOLOGY

## 2020-01-25 PROCEDURE — 25010000002 BUTORPHANOL PER 1 MG: Performed by: OBSTETRICS & GYNECOLOGY

## 2020-01-25 RX ORDER — OXYTOCIN/0.9 % SODIUM CHLORIDE 30/500 ML
2 PLASTIC BAG, INJECTION (ML) INTRAVENOUS
Status: DISCONTINUED | OUTPATIENT
Start: 2020-01-25 | End: 2020-01-26

## 2020-01-25 RX ORDER — OXYTOCIN/0.9 % SODIUM CHLORIDE 30/500 ML
85 PLASTIC BAG, INJECTION (ML) INTRAVENOUS ONCE
Status: DISCONTINUED | OUTPATIENT
Start: 2020-01-25 | End: 2020-01-26 | Stop reason: HOSPADM

## 2020-01-25 RX ORDER — ACETAMINOPHEN 500 MG
1000 TABLET ORAL EVERY 8 HOURS
Status: DISCONTINUED | OUTPATIENT
Start: 2020-01-25 | End: 2020-01-26 | Stop reason: HOSPADM

## 2020-01-25 RX ORDER — PROMETHAZINE HYDROCHLORIDE 25 MG/ML
12.5 INJECTION, SOLUTION INTRAMUSCULAR; INTRAVENOUS
Status: DISCONTINUED | OUTPATIENT
Start: 2020-01-25 | End: 2020-01-26 | Stop reason: HOSPADM

## 2020-01-25 RX ORDER — OXYTOCIN/0.9 % SODIUM CHLORIDE 30/500 ML
650 PLASTIC BAG, INJECTION (ML) INTRAVENOUS ONCE
Status: DISCONTINUED | OUTPATIENT
Start: 2020-01-25 | End: 2020-01-26 | Stop reason: HOSPADM

## 2020-01-25 RX ORDER — PROMETHAZINE HYDROCHLORIDE 12.5 MG/1
12.5 TABLET ORAL EVERY 6 HOURS PRN
Status: DISCONTINUED | OUTPATIENT
Start: 2020-01-25 | End: 2020-01-26 | Stop reason: HOSPADM

## 2020-01-25 RX ORDER — MISOPROSTOL 200 UG/1
800 TABLET ORAL AS NEEDED
Status: DISCONTINUED | OUTPATIENT
Start: 2020-01-25 | End: 2020-01-26 | Stop reason: HOSPADM

## 2020-01-25 RX ORDER — SODIUM CHLORIDE, SODIUM LACTATE, POTASSIUM CHLORIDE, CALCIUM CHLORIDE 600; 310; 30; 20 MG/100ML; MG/100ML; MG/100ML; MG/100ML
125 INJECTION, SOLUTION INTRAVENOUS CONTINUOUS
Status: DISCONTINUED | OUTPATIENT
Start: 2020-01-25 | End: 2020-01-26

## 2020-01-25 RX ORDER — SODIUM CHLORIDE 0.9 % (FLUSH) 0.9 %
3 SYRINGE (ML) INJECTION EVERY 12 HOURS SCHEDULED
Status: DISCONTINUED | OUTPATIENT
Start: 2020-01-25 | End: 2020-01-26 | Stop reason: HOSPADM

## 2020-01-25 RX ORDER — SODIUM CHLORIDE 0.9 % (FLUSH) 0.9 %
3-10 SYRINGE (ML) INJECTION AS NEEDED
Status: DISCONTINUED | OUTPATIENT
Start: 2020-01-25 | End: 2020-01-26 | Stop reason: HOSPADM

## 2020-01-25 RX ORDER — CARBOPROST TROMETHAMINE 250 UG/ML
250 INJECTION, SOLUTION INTRAMUSCULAR AS NEEDED
Status: DISCONTINUED | OUTPATIENT
Start: 2020-01-25 | End: 2020-01-26 | Stop reason: HOSPADM

## 2020-01-25 RX ORDER — MISOPROSTOL 100 MCG
50 TABLET ORAL EVERY 6 HOURS SCHEDULED
Status: DISCONTINUED | OUTPATIENT
Start: 2020-01-25 | End: 2020-01-25

## 2020-01-25 RX ORDER — METHYLERGONOVINE MALEATE 0.2 MG/ML
200 INJECTION INTRAVENOUS ONCE AS NEEDED
Status: DISCONTINUED | OUTPATIENT
Start: 2020-01-25 | End: 2020-01-26 | Stop reason: HOSPADM

## 2020-01-25 RX ORDER — BUPIVACAINE HYDROCHLORIDE 2.5 MG/ML
INJECTION, SOLUTION EPIDURAL; INFILTRATION; INTRACAUDAL AS NEEDED
Status: DISCONTINUED | OUTPATIENT
Start: 2020-01-25 | End: 2020-01-26 | Stop reason: SURG

## 2020-01-25 RX ORDER — LIDOCAINE HYDROCHLORIDE AND EPINEPHRINE 15; 5 MG/ML; UG/ML
INJECTION, SOLUTION EPIDURAL AS NEEDED
Status: DISCONTINUED | OUTPATIENT
Start: 2020-01-25 | End: 2020-01-26 | Stop reason: SURG

## 2020-01-25 RX ORDER — LIDOCAINE HYDROCHLORIDE 10 MG/ML
5 INJECTION, SOLUTION EPIDURAL; INFILTRATION; INTRACAUDAL; PERINEURAL AS NEEDED
Status: DISCONTINUED | OUTPATIENT
Start: 2020-01-25 | End: 2020-01-26 | Stop reason: HOSPADM

## 2020-01-25 RX ORDER — ONDANSETRON 4 MG/1
4 TABLET, FILM COATED ORAL EVERY 6 HOURS PRN
Status: DISCONTINUED | OUTPATIENT
Start: 2020-01-25 | End: 2020-01-26 | Stop reason: HOSPADM

## 2020-01-25 RX ORDER — PROMETHAZINE HYDROCHLORIDE 12.5 MG/1
12.5 SUPPOSITORY RECTAL EVERY 6 HOURS PRN
Status: DISCONTINUED | OUTPATIENT
Start: 2020-01-25 | End: 2020-01-26 | Stop reason: HOSPADM

## 2020-01-25 RX ORDER — ONDANSETRON 2 MG/ML
4 INJECTION INTRAMUSCULAR; INTRAVENOUS EVERY 6 HOURS PRN
Status: DISCONTINUED | OUTPATIENT
Start: 2020-01-25 | End: 2020-01-26 | Stop reason: HOSPADM

## 2020-01-25 RX ORDER — IBUPROFEN 800 MG/1
800 TABLET ORAL EVERY 8 HOURS
Status: DISCONTINUED | OUTPATIENT
Start: 2020-01-25 | End: 2020-01-26 | Stop reason: HOSPADM

## 2020-01-25 RX ADMIN — SODIUM CHLORIDE, POTASSIUM CHLORIDE, SODIUM LACTATE AND CALCIUM CHLORIDE 125 ML/HR: 600; 310; 30; 20 INJECTION, SOLUTION INTRAVENOUS at 13:16

## 2020-01-25 RX ADMIN — SODIUM CHLORIDE, POTASSIUM CHLORIDE, SODIUM LACTATE AND CALCIUM CHLORIDE 125 ML/HR: 600; 310; 30; 20 INJECTION, SOLUTION INTRAVENOUS at 21:40

## 2020-01-25 RX ADMIN — SODIUM CHLORIDE, POTASSIUM CHLORIDE, SODIUM LACTATE AND CALCIUM CHLORIDE 125 ML/HR: 600; 310; 30; 20 INJECTION, SOLUTION INTRAVENOUS at 22:24

## 2020-01-25 RX ADMIN — Medication 10 ML/HR: at 22:37

## 2020-01-25 RX ADMIN — PROMETHAZINE HYDROCHLORIDE 12.5 MG: 25 INJECTION INTRAMUSCULAR; INTRAVENOUS at 17:14

## 2020-01-25 RX ADMIN — SODIUM CHLORIDE, POTASSIUM CHLORIDE, SODIUM LACTATE AND CALCIUM CHLORIDE 125 ML: 600; 310; 30; 20 INJECTION, SOLUTION INTRAVENOUS at 06:02

## 2020-01-25 RX ADMIN — BUPIVACAINE HYDROCHLORIDE 10 ML: 2.5 INJECTION, SOLUTION EPIDURAL; INFILTRATION; INTRACAUDAL; PERINEURAL at 22:30

## 2020-01-25 RX ADMIN — LIDOCAINE HYDROCHLORIDE AND EPINEPHRINE 3 ML: 15; 5 INJECTION, SOLUTION EPIDURAL at 22:23

## 2020-01-25 RX ADMIN — MISOPROSTOL 50 MCG: 100 TABLET ORAL at 06:36

## 2020-01-25 RX ADMIN — BUTORPHANOL TARTRATE 2 MG: 2 INJECTION, SOLUTION INTRAMUSCULAR; INTRAVENOUS at 17:14

## 2020-01-25 RX ADMIN — OXYTOCIN-SODIUM CHLORIDE 0.9% IV SOLN 30 UNIT/500ML 2 MILLI-UNITS/MIN: 30-0.9/5 SOLUTION at 15:56

## 2020-01-26 PROCEDURE — 51703 INSERT BLADDER CATH COMPLEX: CPT

## 2020-01-26 PROCEDURE — 59409 OBSTETRICAL CARE: CPT | Performed by: OBSTETRICS & GYNECOLOGY

## 2020-01-26 PROCEDURE — C1755 CATHETER, INTRASPINAL: HCPCS

## 2020-01-26 PROCEDURE — 0UQGXZZ REPAIR VAGINA, EXTERNAL APPROACH: ICD-10-PCS | Performed by: OBSTETRICS & GYNECOLOGY

## 2020-01-26 RX ORDER — ACETAMINOPHEN 500 MG
1000 TABLET ORAL EVERY 8 HOURS
Status: DISCONTINUED | OUTPATIENT
Start: 2020-01-26 | End: 2020-01-28 | Stop reason: HOSPADM

## 2020-01-26 RX ORDER — ACETAMINOPHEN 500 MG
1000 TABLET ORAL EVERY 8 HOURS
Status: DISCONTINUED | OUTPATIENT
Start: 2020-01-26 | End: 2020-01-26

## 2020-01-26 RX ORDER — ONDANSETRON 2 MG/ML
4 INJECTION INTRAMUSCULAR; INTRAVENOUS EVERY 6 HOURS PRN
Status: DISCONTINUED | OUTPATIENT
Start: 2020-01-26 | End: 2020-01-28 | Stop reason: HOSPADM

## 2020-01-26 RX ORDER — ONDANSETRON 4 MG/1
4 TABLET, FILM COATED ORAL EVERY 6 HOURS PRN
Status: DISCONTINUED | OUTPATIENT
Start: 2020-01-26 | End: 2020-01-28 | Stop reason: HOSPADM

## 2020-01-26 RX ORDER — PRENATAL VIT/IRON FUM/FOLIC AC 27MG-0.8MG
1 TABLET ORAL DAILY
Status: DISCONTINUED | OUTPATIENT
Start: 2020-01-26 | End: 2020-01-28 | Stop reason: HOSPADM

## 2020-01-26 RX ORDER — SODIUM CHLORIDE 0.9 % (FLUSH) 0.9 %
1-10 SYRINGE (ML) INJECTION AS NEEDED
Status: DISCONTINUED | OUTPATIENT
Start: 2020-01-26 | End: 2020-01-28 | Stop reason: HOSPADM

## 2020-01-26 RX ORDER — IBUPROFEN 800 MG/1
800 TABLET ORAL EVERY 8 HOURS
Status: DISCONTINUED | OUTPATIENT
Start: 2020-01-26 | End: 2020-01-26

## 2020-01-26 RX ORDER — BUPRENORPHINE HYDROCHLORIDE 8 MG/1
24 TABLET SUBLINGUAL NIGHTLY
Status: DISCONTINUED | OUTPATIENT
Start: 2020-01-26 | End: 2020-01-28 | Stop reason: HOSPADM

## 2020-01-26 RX ORDER — BISACODYL 10 MG
10 SUPPOSITORY, RECTAL RECTAL DAILY PRN
Status: DISCONTINUED | OUTPATIENT
Start: 2020-01-27 | End: 2020-01-28 | Stop reason: HOSPADM

## 2020-01-26 RX ORDER — OXYTOCIN/0.9 % SODIUM CHLORIDE 30/500 ML
650 PLASTIC BAG, INJECTION (ML) INTRAVENOUS ONCE
Status: DISCONTINUED | OUTPATIENT
Start: 2020-01-26 | End: 2020-01-26

## 2020-01-26 RX ORDER — IBUPROFEN 800 MG/1
800 TABLET ORAL EVERY 8 HOURS
Status: DISCONTINUED | OUTPATIENT
Start: 2020-01-26 | End: 2020-01-28 | Stop reason: HOSPADM

## 2020-01-26 RX ORDER — DOCUSATE SODIUM 100 MG/1
100 CAPSULE, LIQUID FILLED ORAL 2 TIMES DAILY
Status: DISCONTINUED | OUTPATIENT
Start: 2020-01-26 | End: 2020-01-28 | Stop reason: HOSPADM

## 2020-01-26 RX ORDER — LANOLIN 100 %
OINTMENT (GRAM) TOPICAL
Status: DISCONTINUED | OUTPATIENT
Start: 2020-01-26 | End: 2020-01-28 | Stop reason: HOSPADM

## 2020-01-26 RX ORDER — OXYTOCIN/0.9 % SODIUM CHLORIDE 30/500 ML
85 PLASTIC BAG, INJECTION (ML) INTRAVENOUS ONCE
Status: DISCONTINUED | OUTPATIENT
Start: 2020-01-26 | End: 2020-01-26

## 2020-01-26 RX ORDER — BUPRENORPHINE 2 MG/1
3 TABLET SUBLINGUAL DAILY
Status: DISCONTINUED | OUTPATIENT
Start: 2020-01-26 | End: 2020-01-26

## 2020-01-26 RX ADMIN — SODIUM CHLORIDE, POTASSIUM CHLORIDE, SODIUM LACTATE AND CALCIUM CHLORIDE 125 ML/HR: 600; 310; 30; 20 INJECTION, SOLUTION INTRAVENOUS at 02:10

## 2020-01-26 RX ADMIN — DOCUSATE SODIUM 100 MG: 100 CAPSULE, LIQUID FILLED ORAL at 20:21

## 2020-01-26 RX ADMIN — DOCUSATE SODIUM 100 MG: 100 CAPSULE, LIQUID FILLED ORAL at 11:11

## 2020-01-26 RX ADMIN — ACETAMINOPHEN 1000 MG: 500 TABLET ORAL at 11:10

## 2020-01-26 RX ADMIN — IBUPROFEN 800 MG: 800 TABLET ORAL at 20:21

## 2020-01-26 RX ADMIN — Medication: at 09:00

## 2020-01-26 RX ADMIN — ACETAMINOPHEN 1000 MG: 500 TABLET ORAL at 20:21

## 2020-01-26 RX ADMIN — IBUPROFEN 800 MG: 800 TABLET ORAL at 11:10

## 2020-01-26 RX ADMIN — BENZOCAINE AND LEVOMENTHOL: 200; 5 SPRAY TOPICAL at 09:00

## 2020-01-26 RX ADMIN — PRENATAL VIT W/ FE FUMARATE-FA TAB 27-0.8 MG 1 TABLET: 27-0.8 TAB at 11:10

## 2020-01-26 NOTE — ANESTHESIA PREPROCEDURE EVALUATION
Anesthesia Evaluation     Patient summary reviewed and Nursing notes reviewed   NPO Solid Status: > 4 hours  NPO Liquid Status: < 2 hours           Airway   Mallampati: II  TM distance: >3 FB  Neck ROM: full  No difficulty expected  Dental - normal exam     Pulmonary - normal exam   (+) a smoker Current, asthma,  Cardiovascular - negative cardio ROS    Rhythm: regular  Rate: normal        Neuro/Psych  (+) headaches, psychiatric history Anxiety,     GI/Hepatic/Renal/Endo - negative ROS     Musculoskeletal     Abdominal    Substance History   (+) drug use     OB/GYN    (+) Pregnant,         Other - negative ROS                       Anesthesia Plan    ASA 2     epidural       Anesthetic plan, all risks, benefits, and alternatives have been provided, discussed and informed consent has been obtained with: patient.    Plan discussed with CRNA.

## 2020-01-26 NOTE — ANESTHESIA POSTPROCEDURE EVALUATION
Patient: Kate Baeza    Procedure Summary     Date:  01/25/20 Room / Location:      Anesthesia Start:  2200 Anesthesia Stop:  01/26/20 0713    Procedure:  LABOR ANALGESIA Diagnosis:      Scheduled Providers:   Provider:  Yonatan Castro CRNA    Anesthesia Type:  epidural ASA Status:  2          Anesthesia Type: epidural    Vitals  Vitals Value Taken Time   /56 1/26/2020  7:06 AM   Temp 98.1 °F (36.7 °C) 1/26/2020  5:22 AM   Pulse 85 1/26/2020  7:06 AM   Resp     SpO2 82 % 1/25/2020 10:42 PM   Vitals shown include unvalidated device data.        Post Anesthesia Care and Evaluation    Patient location during evaluation: bedside  Patient participation: complete - patient participated  Level of consciousness: awake  Pain score: 0  Pain management: adequate  Airway patency: patent  Anesthetic complications: No anesthetic complications  PONV Status: none  Cardiovascular status: acceptable  Respiratory status: acceptable  Hydration status: acceptable  Post Neuraxial Block status: No signs or symptoms of PDPH

## 2020-01-26 NOTE — ANESTHESIA PROCEDURE NOTES
Labor Epidural      Patient reassessed immediately prior to procedure    Patient location during procedure: OB  Start Time: 1/25/2020 10:00 PM  Indication:at surgeon's request  Performed By  CRNA: Yonatan Castro CRNA  Preanesthetic Checklist  Completed: patient identified, site marked, surgical consent, pre-op evaluation, timeout performed, IV checked, risks and benefits discussed and monitors and equipment checked  Prep:  Pt Position:sitting  Sterile Tech:cap, gloves, mask and sterile barrier  Prep:chlorhexidine gluconate and isopropyl alcohol  Monitoring:blood pressure monitoring and continuous pulse oximetry  Epidural Block Procedure:  Approach:midline  Guidance:landmark technique  Location:L4-L5  Needle Type:Tuohy  Needle Gauge:17 G  Loss of Resistance Medium: saline  Loss of Resistance: 7cm  Cath Depth at skin:12 cm  Paresthesia: none  Aspiration:negative  Test Dose:negative  Number of Attempts: 1  Post Assessment:  Dressing:occlusive dressing applied and secured with tape  Pt Tolerance:patient tolerated the procedure well with no apparent complications  Complications:no

## 2020-01-27 PROCEDURE — 99232 SBSQ HOSP IP/OBS MODERATE 35: CPT | Performed by: OBSTETRICS & GYNECOLOGY

## 2020-01-27 RX ADMIN — IBUPROFEN 800 MG: 800 TABLET ORAL at 18:29

## 2020-01-27 RX ADMIN — DOCUSATE SODIUM 100 MG: 100 CAPSULE, LIQUID FILLED ORAL at 08:21

## 2020-01-27 RX ADMIN — PRENATAL VIT W/ FE FUMARATE-FA TAB 27-0.8 MG 1 TABLET: 27-0.8 TAB at 08:21

## 2020-01-27 RX ADMIN — DOCUSATE SODIUM 100 MG: 100 CAPSULE, LIQUID FILLED ORAL at 22:06

## 2020-01-27 RX ADMIN — ACETAMINOPHEN 1000 MG: 500 TABLET ORAL at 18:29

## 2020-01-27 RX ADMIN — ACETAMINOPHEN 1000 MG: 500 TABLET ORAL at 05:20

## 2020-01-27 RX ADMIN — IBUPROFEN 800 MG: 800 TABLET ORAL at 05:19

## 2020-01-28 VITALS
SYSTOLIC BLOOD PRESSURE: 114 MMHG | HEIGHT: 65 IN | DIASTOLIC BLOOD PRESSURE: 65 MMHG | OXYGEN SATURATION: 97 % | TEMPERATURE: 98.1 F | BODY MASS INDEX: 24.96 KG/M2 | HEART RATE: 87 BPM | RESPIRATION RATE: 18 BRPM

## 2020-01-28 PROBLEM — O99.333 TOBACCO SMOKING AFFECTING PREGNANCY IN THIRD TRIMESTER: Status: RESOLVED | Noted: 2019-09-26 | Resolved: 2020-01-28

## 2020-01-28 PROBLEM — O99.320 PREGNANCY COMPLICATED BY SUBUTEX MAINTENANCE, ANTEPARTUM: Status: RESOLVED | Noted: 2019-09-05 | Resolved: 2020-01-28

## 2020-01-28 PROBLEM — F11.20 PREGNANCY COMPLICATED BY SUBUTEX MAINTENANCE, ANTEPARTUM: Status: RESOLVED | Noted: 2019-09-05 | Resolved: 2020-01-28

## 2020-01-28 PROBLEM — O09.93 SUPERVISION OF HIGH RISK PREGNANCY IN THIRD TRIMESTER: Status: RESOLVED | Noted: 2019-09-26 | Resolved: 2020-01-28

## 2020-01-28 PROCEDURE — 99232 SBSQ HOSP IP/OBS MODERATE 35: CPT | Performed by: OBSTETRICS & GYNECOLOGY

## 2020-01-28 RX ORDER — ACETAMINOPHEN 500 MG
1000 TABLET ORAL EVERY 8 HOURS PRN
Qty: 100 TABLET | Refills: 0 | Status: SHIPPED | OUTPATIENT
Start: 2020-01-28 | End: 2020-03-10

## 2020-01-28 RX ORDER — IBUPROFEN 800 MG/1
800 TABLET ORAL EVERY 6 HOURS PRN
Qty: 80 TABLET | Refills: 0 | Status: SHIPPED | OUTPATIENT
Start: 2020-01-28 | End: 2020-03-10

## 2020-01-28 RX ADMIN — PRENATAL VIT W/ FE FUMARATE-FA TAB 27-0.8 MG 1 TABLET: 27-0.8 TAB at 09:56

## 2020-01-28 RX ADMIN — IBUPROFEN 800 MG: 800 TABLET ORAL at 04:18

## 2020-01-28 RX ADMIN — DOCUSATE SODIUM 100 MG: 100 CAPSULE, LIQUID FILLED ORAL at 09:56

## 2020-01-28 RX ADMIN — ACETAMINOPHEN 1000 MG: 500 TABLET ORAL at 04:18

## 2020-01-29 LAB
BUPRENORPHINE UR CFM-MCNC: 227 NG/ML
BUPRENORPHINE UR QL: POSITIVE
BUPRENORPHINE+NOR UR QL: POSITIVE
NORBUPRENORPHINE CONFIRM: 1742 NG/ML
NORBUPRENORPHINE SERPLBLD-MCNC: POSITIVE NG/ML

## 2020-01-30 DIAGNOSIS — Z3A.38 38 WEEKS GESTATION OF PREGNANCY: ICD-10-CM

## 2020-01-30 DIAGNOSIS — O99.333 TOBACCO SMOKING COMPLICATING PREGNANCY IN THIRD TRIMESTER: Primary | ICD-10-CM

## 2020-02-03 ENCOUNTER — POSTPARTUM VISIT (OUTPATIENT)
Dept: OBSTETRICS AND GYNECOLOGY | Facility: CLINIC | Age: 25
End: 2020-02-03

## 2020-02-03 VITALS
DIASTOLIC BLOOD PRESSURE: 66 MMHG | WEIGHT: 134 LBS | HEIGHT: 65 IN | SYSTOLIC BLOOD PRESSURE: 100 MMHG | BODY MASS INDEX: 22.33 KG/M2

## 2020-02-03 DIAGNOSIS — Z3A.38 38 WEEKS GESTATION OF PREGNANCY: ICD-10-CM

## 2020-02-03 DIAGNOSIS — O99.333 TOBACCO SMOKING COMPLICATING PREGNANCY IN THIRD TRIMESTER: ICD-10-CM

## 2020-02-03 PROCEDURE — 99213 OFFICE O/P EST LOW 20 MIN: CPT | Performed by: OBSTETRICS & GYNECOLOGY

## 2020-02-03 NOTE — PROGRESS NOTES
"Postpartum visit      Kate Baeza is a 24 y.o.  s/p Vaginal, Spontaneous on 2019 at 39w4d secondary to EIOL for Subutex use who presents today for a 6 week postpartum check.  The patient states she is doing well.  Patient denies postpartum depression.  Menstrual cycles have not resumed.  Bottlefeeding.  Desires Nexplanon for contraception.  She has not resumed sexual intercourse.  Denies bowel or bladder issues.    PHYSICAL EXAM:    /66   Ht 165.1 cm (65\")   Wt 60.8 kg (134 lb)   LMP 2019   Breastfeeding Yes   BMI 22.30 kg/m²   Abdomen: +BS, benign, no masses, soft, non-tender.  Extremities: No deep calf tenderness.    IMPRESSION/PLAN:  24 y.o.  s/p term Vaginal, Spontaneous on 2019 at 39w4d.  Doing well.  - Recovered nicely from her delivery  - Contraception: Nexplanon; will do at 6wk PP visit  - RTC 5 weeks for final RPN    This document has been electronically signed by Cookie Villa MD on February 3, 2020 3:41 PM.  "

## 2020-02-05 ENCOUNTER — TELEPHONE (OUTPATIENT)
Dept: LACTATION | Facility: HOSPITAL | Age: 25
End: 2020-02-05

## 2020-02-05 NOTE — TELEPHONE ENCOUNTER
Lactation follow up call. Mother states that breast feeding is going well and infant is gaining weight. No questions or concerns at this time.

## 2020-02-19 ENCOUNTER — TELEPHONE (OUTPATIENT)
Dept: OBSTETRICS AND GYNECOLOGY | Facility: CLINIC | Age: 25
End: 2020-02-19

## 2020-03-10 ENCOUNTER — POSTPARTUM VISIT (OUTPATIENT)
Dept: OBSTETRICS AND GYNECOLOGY | Facility: CLINIC | Age: 25
End: 2020-03-10

## 2020-03-10 VITALS
DIASTOLIC BLOOD PRESSURE: 64 MMHG | WEIGHT: 131 LBS | BODY MASS INDEX: 21.83 KG/M2 | HEIGHT: 65 IN | SYSTOLIC BLOOD PRESSURE: 100 MMHG

## 2020-03-10 DIAGNOSIS — Z12.4 CERVICAL CANCER SCREENING: ICD-10-CM

## 2020-03-10 DIAGNOSIS — Z30.017 NEXPLANON INSERTION: ICD-10-CM

## 2020-03-10 LAB
B-HCG UR QL: NEGATIVE
INTERNAL NEGATIVE CONTROL: NEGATIVE
INTERNAL POSITIVE CONTROL: POSITIVE
Lab: NORMAL

## 2020-03-10 PROCEDURE — 88141 CYTOPATH C/V INTERPRET: CPT | Performed by: PATHOLOGY

## 2020-03-10 PROCEDURE — 99213 OFFICE O/P EST LOW 20 MIN: CPT | Performed by: OBSTETRICS & GYNECOLOGY

## 2020-03-10 PROCEDURE — 11981 INSERTION DRUG DLVR IMPLANT: CPT | Performed by: OBSTETRICS & GYNECOLOGY

## 2020-03-10 PROCEDURE — 81025 URINE PREGNANCY TEST: CPT | Performed by: OBSTETRICS & GYNECOLOGY

## 2020-03-10 PROCEDURE — G0123 SCREEN CERV/VAG THIN LAYER: HCPCS | Performed by: OBSTETRICS & GYNECOLOGY

## 2020-03-10 RX ORDER — BUPRENORPHINE HYDROCHLORIDE AND NALOXONE HYDROCHLORIDE DIHYDRATE 8; 2 MG/1; MG/1
1 TABLET SUBLINGUAL 2 TIMES DAILY
COMMUNITY
Start: 2020-03-05

## 2020-03-10 NOTE — PROGRESS NOTES
[facility]  Gynecology  Procedure Note: Nexplanon insertion    Patient's last menstrual period was 04/04/2019.    Date of procedure:  3/10/2020    Risks and benefits discussed? yes  All questions answered? yes  Consents given by the patient  Written consent obtained? yes    Local anesthesia used:  yes - 1.5 cc's of  Meds; anesthesia local: 1% lidocaine with epinephrine    Procedure documentation:    The upper left arm (non-dominant) was marked at the intended site of insertion.  The skin was cleansed with an antiseptic solution.  Local anesthesia was injected.  The Nexplanon was placed subdermally without difficulty.  The devise was able to be palpated in the arm by both myself and Kate.  The site was cleansed then a 4x4 clean gauze was place over the site of insertion and wrapped with gauze.     She tolerated the procedure well.  There were no complications.  EBL was minimal.    Nexplanon information  NDC: 6680-8941-34  Lot #: [id number]  Expiration date: 8/6/2022    Post procedure instructions: Remove the wrapping in 24 hours and cover with a band aid if still open.    Follow up needed: SARIAH Villa MD  3/10/2020  17:07

## 2020-03-10 NOTE — PROGRESS NOTES
"Postpartum visit      Kate Baeza is a 24 y.o.  s/p Vaginal, Spontaneous on 2019 at 39w4d secondary to EIOL for Subutex use who presents today for a 6 week postpartum check.  The patient states she is doing well.  Patient denies postpartum depression.  Menstrual cycles have not resumed.  Bottlefeeding.  Desires Nexplanon for contraception.  She has not resumed sexual intercourse.  Denies bowel or bladder issues.    PHYSICAL EXAM:    /64   Ht 165.1 cm (65\")   Wt 59.4 kg (131 lb)   LMP 2019   Breastfeeding Yes   BMI 21.80 kg/m²   Abdomen: +BS, benign, no masses, soft, non-tender.  Extremities: No deep calf tenderness.  External Genitalia/Vulva: Anatomy is normal, no significant redness of labia, no discharge on vulvar tissues, Priddy's and Bartholin's glands are normal, no ulcers, no condylomatous lesions.  Urethra: Normal, no lesions.  Vagina: Vaginal tissues are not inflamed, normal color and texture, no significant discharge present.  Cervix: Normal in appearance without lesions or purulent discharge, no cervical motion tenderness.  Pap collected.  Uterus: Normal size, shape, and consistency.  Adnexa: Normal size and shape bilaterally, no palpable mass bilaterally and non-tender bilaterally.  Rectal: Normal, no masses or polyps, confirms bimanual exam, perianal normal, no lesions; VERNA deferred.    IMPRESSION/PLAN:  24 y.o.  s/p term Vaginal, Spontaneous on 2019 at 39w4d.  Doing well.  - Recovered nicely from her delivery  - Contraception: Nexplanon; see procedure note  - Pap smear collected  - RTC 1 year for annual visit    This document has been electronically signed by Cookie Villa MD on March 10, 2020 17:03.  "

## 2020-03-13 LAB
GEN CATEG CVX/VAG CYTO-IMP: NORMAL
LAB AP CASE REPORT: NORMAL
LAB AP GYN ADDITIONAL INFORMATION: NORMAL
LAB AP GYN OTHER FINDINGS: NORMAL
PATH INTERP SPEC-IMP: NORMAL
STAT OF ADQ CVX/VAG CYTO-IMP: NORMAL

## 2020-06-19 DIAGNOSIS — K59.00 CONSTIPATION, UNSPECIFIED CONSTIPATION TYPE: Primary | ICD-10-CM

## 2021-02-26 ENCOUNTER — TRANSCRIBE ORDERS (OUTPATIENT)
Dept: PODIATRY | Facility: CLINIC | Age: 26
End: 2021-02-26

## 2021-02-26 DIAGNOSIS — S92.501A CLOSED FRACTURE OF PHALANX OF RIGHT FIFTH TOE, INITIAL ENCOUNTER: Primary | ICD-10-CM

## 2021-03-04 ENCOUNTER — OFFICE VISIT (OUTPATIENT)
Dept: PODIATRY | Facility: CLINIC | Age: 26
End: 2021-03-04

## 2021-03-04 VITALS — WEIGHT: 131 LBS | OXYGEN SATURATION: 99 % | BODY MASS INDEX: 21.83 KG/M2 | HEIGHT: 65 IN | HEART RATE: 105 BPM

## 2021-03-04 DIAGNOSIS — S92.514A CLOSED NONDISPLACED FRACTURE OF PROXIMAL PHALANX OF LESSER TOE OF RIGHT FOOT, INITIAL ENCOUNTER: Primary | ICD-10-CM

## 2021-03-04 DIAGNOSIS — M79.671 RIGHT FOOT PAIN: ICD-10-CM

## 2021-03-04 PROCEDURE — 28510 TREATMENT OF TOE FRACTURE: CPT | Performed by: PODIATRIST

## 2021-03-04 PROCEDURE — 99203 OFFICE O/P NEW LOW 30 MIN: CPT | Performed by: PODIATRIST

## 2021-03-04 RX ORDER — DICYCLOMINE HYDROCHLORIDE 10 MG/1
CAPSULE ORAL
COMMUNITY
Start: 2021-01-04 | End: 2021-03-04

## 2021-03-04 RX ORDER — IBUPROFEN 100 MG/5ML
SUSPENSION ORAL
COMMUNITY
Start: 2021-01-08 | End: 2021-05-13

## 2021-03-04 RX ORDER — BUPROPION HYDROCHLORIDE 150 MG/1
TABLET ORAL
COMMUNITY
Start: 2020-12-17 | End: 2021-05-13

## 2021-03-04 RX ORDER — LINACLOTIDE 72 UG/1
72 CAPSULE, GELATIN COATED ORAL DAILY
COMMUNITY
Start: 2021-01-12 | End: 2021-03-25 | Stop reason: ALTCHOICE

## 2021-03-04 RX ORDER — DICYCLOMINE HCL 20 MG
20 TABLET ORAL 2 TIMES DAILY
COMMUNITY
Start: 2021-01-06 | End: 2021-06-28

## 2021-03-04 RX ORDER — DEXTROAMPHETAMINE SACCHARATE, AMPHETAMINE ASPARTATE, DEXTROAMPHETAMINE SULFATE AND AMPHETAMINE SULFATE 7.5; 7.5; 7.5; 7.5 MG/1; MG/1; MG/1; MG/1
30 TABLET ORAL 2 TIMES DAILY
COMMUNITY
Start: 2020-12-14

## 2021-03-04 NOTE — PROGRESS NOTES
Kate Baeza  1995  25 y.o. female     Patient came to clinic for concern of right foot fifth toe may be broken. Patient states her pain is 4/10. Xray obtained today.     03/04/2021  Chief Complaint   Patient presents with   • Right Foot - Pain           History of Present Illness    Kate Baeza is a 25 y.o. female who presents for evaluation of right foot pain.  Pain is isolated to her fifth toe.  She hit her toe on the corner of a wall on February 8.  She feels she may have broken her fifth toe.  There is persistent swelling and she is concerned of local deformity.  Describes her pain as achy and at times throbbing.  Denies any formal prior treatments for this issue.      Past Medical History:   Diagnosis Date   • Allergic    • Anemia    • Anxiety    • Asthma    • Headache    • Migraine    • Penicillin allergy    • Smoker    • Substance abuse (CMS/[facility])    • Substance abuse (CMS/[facility] located [facility])          Past Surgical History:   Procedure Laterality Date   • FACIAL LACERATIONS REPAIR     • MOUTH SURGERY     • WISDOM TOOTH EXTRACTION           Family History   Problem Relation Age of Onset   • Depression Mother    • Hypothyroidism Mother    • Thyroid disease Mother    • COPD Maternal Grandmother    • Breast cancer Maternal Grandmother    • Hyperlipidemia Father    • Post-traumatic stress disorder Father    • No Known Problems Sister    • Alcohol abuse Paternal Grandfather    • Cancer Paternal Grandmother    • No Known Problems Maternal Grandfather          Social History     Socioeconomic History   • Marital status: Single     Spouse name: Not on file   • Number of children: Not on file   • Years of education: Not on file   • Highest education level: Not on file   Tobacco Use   • Smoking status: Current Some Day Smoker     Packs/day: 0.25     Years: 10.00     Pack years: 2.50     Types: Cigarettes     Start date: 2007   • Smokeless tobacco: Never Used   • Tobacco comment: smokes an e-cigarette   Vaping Use   • Vaping  "Use: Never used   Substance and Sexual Activity   • Alcohol use: No   • Drug use: Yes     Types: Heroin     Comment: opiods- now on suboxone, sees Dr. Sagastume at [facility]    • Sexual activity: Yes     Partners: Male     Comment: never had a pap smear          Current Outpatient Medications   Medication Sig Dispense Refill   • albuterol sulfate  (90 Base) MCG/ACT inhaler Inhale 1 puff 4 (Four) Times a Day. 6.7 g 1   • amphetamine-dextroamphetamine (Adderall) 30 MG tablet      • buprenorphine-naloxone (SUBOXONE) 8-2 MG per SL tablet      • buPROPion XL (WELLBUTRIN XL) 150 MG 24 hr tablet      • dicyclomine (BENTYL) 20 MG tablet      • GNP Childrens Ibuprofen 100 MG/5ML suspension      • Linzess 72 MCG capsule capsule      • polyethylene glycol (MIRALAX) powder powder Take 17 g by mouth Daily. 765 g 5   • Prenatal Vit-Fe Fumarate-FA (MYNATAL PLUS) tablet Take 1 tablet by mouth Daily. 30 each 12     Current Facility-Administered Medications   Medication Dose Route Frequency Provider Last Rate Last Admin   • Etonogestrel (NEXPLANON) 68 MG subdermal implant   Intradermal Once Cookie Villa MD             OBJECTIVE    Pulse 105   Ht 165.1 cm (65\")   Wt 59.4 kg (131 lb)   SpO2 99%   BMI 21.80 kg/m²       Review of Systems   Constitutional: Negative.    Eyes: Positive for visual disturbance.   Respiratory: Negative.    Cardiovascular: Negative.    Gastrointestinal: Positive for abdominal pain, blood in stool, constipation and diarrhea.   Endocrine: Negative.    Genitourinary: Negative.    Musculoskeletal:        Foot pain      Skin: Negative.    Allergic/Immunologic: Negative.    Neurological: Positive for headaches.   Hematological: Negative.    Psychiatric/Behavioral: The patient is nervous/anxious.          Physical Exam   Constitutional: she appears well-developed and well-nourished.   HEENT: Normocephalic. Atraumatic  CV: No CP. RRR  Resp: Non-labored respirations  Psychiatric: she has a " normal mood and affect. her behavior is normal.         Lower Extremity Exam:  Vascular: DP/PT pulses palpable 2+.   Mild right fourth toe edema  Foot warm  CFT wnl  Neuro: Protective sensation intact, b/l.  DTRs intact  Integument: No open wounds or lesions.  No ecchymosis or erythema  Skin quality normal  Musculoskeletal: LE muscle strength 5/5.   Gait normal  Ankle ROM full without pain or crepitus  STJ ROM full without pain or crepitus  Can flex/extend all toes  Mild tenderness palpation right fifth proximal interphalangeal joint.  No gross instability or crepitus.  Alignment rectus.              ASSESSMENT AND PLAN    Diagnoses and all orders for this visit:    1. Closed nondisplaced fracture of proximal phalanx of lesser toe of right foot, initial encounter (Primary)    2. Right foot pain  -     XR Foot 3+ View Right      -Comprehensive foot and ankle exam performed  -Radiographs ordered reviewed.  Patient has a tiny avulsion type fracture of distal aspect of the proximal phalanx.  -Educated pt on diagnosis, etiology and treatment of digital fracture  -Stable shoe gear, buddy taping as needed.  Dispensed toe sleeve for offloading and edema control.  -Recheck as needed        This document has been electronically signed by Michael Ling DPM on March 6, 2021 12:34 CST     EMR Dragon/Transcription disclaimer:   Much of this encounter note is an electronic transcription/translation of spoken language to printed text. The electronic translation of spoken language may permit erroneous, or at times, nonsensical words or phrases to be inadvertently transcribed; Although I have reviewed the note for such errors, some may still exist.    Michael Ling DPM  3/6/2021  12:34 CST

## 2021-03-25 ENCOUNTER — OFFICE VISIT (OUTPATIENT)
Dept: GASTROENTEROLOGY | Facility: CLINIC | Age: 26
End: 2021-03-25

## 2021-03-25 VITALS
BODY MASS INDEX: 18.33 KG/M2 | SYSTOLIC BLOOD PRESSURE: 103 MMHG | HEART RATE: 102 BPM | DIASTOLIC BLOOD PRESSURE: 67 MMHG | WEIGHT: 110 LBS | HEIGHT: 65 IN

## 2021-03-25 DIAGNOSIS — K59.04 CHRONIC IDIOPATHIC CONSTIPATION: Primary | ICD-10-CM

## 2021-03-25 PROCEDURE — 99204 OFFICE O/P NEW MOD 45 MIN: CPT | Performed by: INTERNAL MEDICINE

## 2021-03-25 RX ORDER — PLECANATIDE 3 MG/1
1 TABLET ORAL DAILY
Qty: 90 TABLET | Refills: 3 | Status: SHIPPED | OUTPATIENT
Start: 2021-03-25 | End: 2021-04-22 | Stop reason: ALTCHOICE

## 2021-03-25 RX ORDER — DEXTROSE AND SODIUM CHLORIDE 5; .45 G/100ML; G/100ML
30 INJECTION, SOLUTION INTRAVENOUS CONTINUOUS PRN
Status: CANCELLED | OUTPATIENT
Start: 2021-03-29

## 2021-03-25 RX ORDER — SODIUM, POTASSIUM,MAG SULFATES 17.5-3.13G
1 SOLUTION, RECONSTITUTED, ORAL ORAL EVERY 12 HOURS
Qty: 1 ML | Refills: 0 | Status: SHIPPED | OUTPATIENT
Start: 2021-03-25 | End: 2021-03-26 | Stop reason: RX

## 2021-03-25 NOTE — PROGRESS NOTES
[facility]      Chief Complaint:   Chief Complaint   Patient presents with   • Constipation   • Rectal Bleeding       Subjective     HPI:   Patient with severe longstanding constipation.  Patient states that she has a bowel movement approximately once a month.  Patient has severe abdominal pain and when she does have a bowel movement there is bloody mucus in stool.  Patient is never had a colonoscopy.  Patient is currently on Suboxone which is probably worsening the constipation patient is taking Linzess which is causing her severe diarrhea and she is unable to take this.  We will start patient on Trulance to see if this improves her symptoms.  We will also schedule patient for colonoscopy to evaluate.    Plan; we will schedule patient for colonoscopy for change in bowel habits and constipation    Past Medical History:   Past Medical History:   Diagnosis Date   • Allergic    • Anemia    • Anxiety    • Asthma    • Constipation 03/25/2021   • Headache    • Migraine    • Penicillin allergy    • Rectal bleeding 03/25/2021   • Smoker    • Substance abuse (CMS/[facility])    • Substance abuse (CMS/[facility])        Past Surgical History:  Past Surgical History:   Procedure Laterality Date   • FACIAL LACERATIONS REPAIR     • MOUTH SURGERY     • WISDOM TOOTH EXTRACTION         Family History:  Family History   Problem Relation Age of Onset   • Depression Mother    • Hypothyroidism Mother    • Thyroid disease Mother    • Colon polyps Mother    • COPD Maternal Grandmother    • Breast cancer Maternal Grandmother    • Hyperlipidemia Father    • Post-traumatic stress disorder Father    • No Known Problems Sister    • Alcohol abuse Paternal Grandfather    • Cancer Paternal Grandmother    • Colon polyps Maternal Grandfather        Social History:   reports that she quit smoking about 2 years ago. Her smoking use included cigarettes. She started smoking about 14 years ago. She has a 3.96 pack-year smoking history. She  has never used smokeless tobacco. She reports previous drug use. Drug: Heroin. She reports that she does not drink alcohol.    Medications:   Prior to Admission medications    Medication Sig Start Date End Date Taking? Authorizing Provider   albuterol sulfate  (90 Base) MCG/ACT inhaler Inhale 1 puff 4 (Four) Times a Day.  Patient taking differently: Inhale 1 puff 4 (Four) Times a Day As Needed. 1/9/20  Yes Cookie Villa MD   amphetamine-dextroamphetamine (Adderall) 30 MG tablet Take 30 mg by mouth 2 (Two) Times a Day. 12/14/20  Yes Kaya Lux MD   buprenorphine-naloxone (SUBOXONE) 8-2 MG per SL tablet Place 1 tablet under the tongue 2 (two) times a day. 3/5/20  Yes Kaya Lux MD   dicyclomine (BENTYL) 20 MG tablet Take 20 mg by mouth 2 (two) times a day. 1/6/21  Yes Kaya Lux MD   polyethylene glycol (MIRALAX) powder powder Take 17 g by mouth Daily.  Patient taking differently: Take 17 g by mouth Daily As Needed. 6/19/20  Yes Jessica Mishra APRN Linzess 72 MCG capsule capsule Take 72 mcg by mouth Daily. 1/12/21 3/25/21 Yes Kaya Lux MD   buPROPion XL (WELLBUTRIN XL) 150 MG 24 hr tablet  12/17/20   Provider, Historical, MD   GNP Childrens Ibuprofen 100 MG/5ML suspension  1/8/21   Kaya Lux MD   Plecanatide (Trulance) 3 MG tablet Take 1 tablet by mouth Daily. 3/25/21   Juan Johnson MD   Prenatal Vit-Fe Fumarate-FA (MYNATAL PLUS) tablet Take 1 tablet by mouth Daily. 1/9/20   Cookie Villa MD   sodium-potassium-magnesium sulfates (SUPREP) 17.5-3.13-1.6 GM/177ML solution oral solution Take 1 bottle by mouth Every 12 (Twelve) Hours. 3/25/21   Juan Johnson MD       Allergies:  Nickel and Penicillins    ROS:    Review of Systems   Constitutional: Negative for activity change, appetite change, chills, diaphoresis, fatigue, fever and unexpected weight change.   HENT: Negative for sore throat and trouble swallowing.   "  Respiratory: Negative for shortness of breath.    Gastrointestinal: Positive for constipation. Negative for abdominal distention, abdominal pain, anal bleeding, blood in stool, diarrhea, nausea, rectal pain and vomiting.   Endocrine: Negative for polydipsia, polyphagia and polyuria.   Genitourinary: Negative for difficulty urinating.   Musculoskeletal: Negative for arthralgias.   Skin: Negative for pallor.   Allergic/Immunologic: Negative for food allergies.   Neurological: Negative for weakness and light-headedness.   Psychiatric/Behavioral: Negative for behavioral problems.     Objective     Blood pressure 103/67, pulse 102, height 165.1 cm (65\"), weight 49.9 kg (110 lb), currently breastfeeding.    Physical Exam  Constitutional:       General: She is not in acute distress.     Appearance: She is well-developed. She is not diaphoretic.   HENT:      Head: Normocephalic and atraumatic.   Cardiovascular:      Rate and Rhythm: Normal rate and regular rhythm.      Heart sounds: Normal heart sounds. No murmur heard.   No friction rub. No gallop.    Pulmonary:      Effort: No respiratory distress.      Breath sounds: Normal breath sounds. No wheezing or rales.   Chest:      Chest wall: No tenderness.   Abdominal:      General: Bowel sounds are normal. There is no distension.      Palpations: Abdomen is soft. There is no mass.      Tenderness: There is no abdominal tenderness. There is no guarding or rebound.      Hernia: No hernia is present.   Musculoskeletal:         General: Normal range of motion.   Skin:     General: Skin is warm and dry.      Coloration: Skin is not pale.      Findings: No erythema or rash.   Neurological:      Mental Status: She is alert and oriented to person, place, and time.   Psychiatric:         Behavior: Behavior normal.         Thought Content: Thought content normal.         Judgment: Judgment normal.          Assessment/Plan   Diagnoses and all orders for this visit:    1. Chronic " idiopathic constipation (Primary)  -     Case Request; Standing  -     dextrose 5 % and sodium chloride 0.45 % infusion  -     Case Request    Other orders  -     Follow Anesthesia Guidelines / Standing Orders; Future  -     Obtain Informed Consent; Future  -     Implement Anesthesia Orders Day of Procedure; Standing  -     Obtain Informed Consent; Standing  -     POC Glucose Once; Standing  -     Pregnancy, Urine -; Standing  -     Insert Peripheral IV; Standing  -     sodium-potassium-magnesium sulfates (SUPREP) 17.5-3.13-1.6 GM/177ML solution oral solution; Take 1 bottle by mouth Every 12 (Twelve) Hours.  Dispense: 1 mL; Refill: 0  -     Plecanatide (Trulance) 3 MG tablet; Take 1 tablet by mouth Daily.  Dispense: 90 tablet; Refill: 3        COLONOSCOPY (N/A)     Diagnosis Plan   1. Chronic idiopathic constipation  Case Request    dextrose 5 % and sodium chloride 0.45 % infusion    Case Request       Anticipated Surgical Procedure:  Orders Placed This Encounter   Procedures   • Follow Anesthesia Guidelines / Standing Orders     Standing Status:   Future   • Obtain Informed Consent     Standing Status:   Future     Order Specific Question:   Informed Consent Given For     Answer:   colonoscopy       The risks, benefits, and alternatives of this procedure have been discussed with the patient or the responsible party- the patient understands and agrees to proceed.

## 2021-03-25 NOTE — PATIENT INSTRUCTIONS
Constipation, Adult  Constipation is when a person has trouble pooping (having a bowel movement). When you have this condition, you may poop fewer than 3 times a week. Your poop (stool) may also be dry, hard, or bigger than normal.  Follow these instructions at home:  Eating and drinking    · Eat foods that have a lot of fiber, such as:  ? Fresh fruits and vegetables.  ? Whole grains.  ? Beans.  · Eat less of foods that are low in fiber and high in fat and sugar, such as:  ? French fries.  ? Hamburgers.  ? Cookies.  ? Candy.  ? Soda.  · Drink enough fluid to keep your pee (urine) pale yellow.  General instructions  · Exercise regularly or as told by your doctor. Try to do 150 minutes of exercise each week.  · Go to the restroom when you feel like you need to poop. Do not hold it in.  · Take over-the-counter and prescription medicines only as told by your doctor. These include any fiber supplements.  · When you poop:  ? Do deep breathing while relaxing your lower belly (abdomen).  ? Relax your pelvic floor. The pelvic floor is a group of muscles that support the rectum, bladder, and intestines (as well as the uterus in women).  · Watch your condition for any changes. Tell your doctor if you notice any.  · Keep all follow-up visits as told by your doctor. This is important.  Contact a doctor if:  · You have pain that gets worse.  · You have a fever.  · You have not pooped for 4 days.  · You vomit.  · You are not hungry.  · You lose weight.  · You are bleeding from the opening of the butt (anus).  · You have thin, pencil-like poop.  Get help right away if:  · You have a fever, and your symptoms suddenly get worse.  · You leak poop or have blood in your poop.  · Your belly feels hard or bigger than normal (bloated).  · You have very bad belly pain.  · You feel dizzy or you faint.  Summary  · Constipation is when a person poops fewer than 3 times a week, has trouble pooping, or has poop that is dry, hard, or bigger than  normal.  · Eat foods that have a lot of fiber.  · Drink enough fluid to keep your pee (urine) pale yellow.  · Take over-the-counter and prescription medicines only as told by your doctor. These include any fiber supplements.  This information is not intended to replace advice given to you by your health care provider. Make sure you discuss any questions you have with your health care provider.  Document Revised: 11/04/2020 Document Reviewed: 11/04/2020  Elsevier Patient Education © 2021 [employer]

## 2021-03-26 ENCOUNTER — LAB (OUTPATIENT)
Dept: LAB | Facility: HOSPITAL | Age: 26
End: 2021-03-26

## 2021-03-26 DIAGNOSIS — Z01.818 PREOP TESTING: Primary | ICD-10-CM

## 2021-03-26 LAB — SARS-COV-2 N GENE RESP QL NAA+PROBE: NOT DETECTED

## 2021-03-26 PROCEDURE — C9803 HOPD COVID-19 SPEC COLLECT: HCPCS

## 2021-03-26 PROCEDURE — 87635 SARS-COV-2 COVID-19 AMP PRB: CPT

## 2021-03-26 RX ORDER — PEG-3350, SODIUM SULFATE, SODIUM CHLORIDE, POTASSIUM CHLORIDE, SODIUM ASCORBATE AND ASCORBIC ACID 7.5-2.691G
1000 KIT ORAL EVERY 12 HOURS
Qty: 2000 ML | Refills: 0 | Status: CANCELLED | OUTPATIENT
Start: 2021-03-26

## 2021-03-26 RX ORDER — POLYETHYLENE GLYCOL 3350, SODIUM CHLORIDE, SODIUM BICARBONATE, POTASSIUM CHLORIDE 420; 11.2; 5.72; 1.48 G/4L; G/4L; G/4L; G/4L
POWDER, FOR SOLUTION ORAL
Qty: 4000 ML | Refills: 0 | Status: ON HOLD | OUTPATIENT
Start: 2021-03-26 | End: 2021-03-29

## 2021-03-29 ENCOUNTER — HOSPITAL ENCOUNTER (OUTPATIENT)
Facility: HOSPITAL | Age: 26
Setting detail: HOSPITAL OUTPATIENT SURGERY
Discharge: HOME | End: 2021-03-29
Attending: INTERNAL MEDICINE | Admitting: INTERNAL MEDICINE

## 2021-03-29 ENCOUNTER — ANESTHESIA (OUTPATIENT)
Dept: GASTROENTEROLOGY | Facility: HOSPITAL | Age: 26
End: 2021-03-29

## 2021-03-29 ENCOUNTER — ANESTHESIA EVENT (OUTPATIENT)
Dept: GASTROENTEROLOGY | Facility: HOSPITAL | Age: 26
End: 2021-03-29

## 2021-03-29 VITALS
SYSTOLIC BLOOD PRESSURE: 92 MMHG | DIASTOLIC BLOOD PRESSURE: 62 MMHG | BODY MASS INDEX: 18.16 KG/M2 | RESPIRATION RATE: 18 BRPM | HEART RATE: 83 BPM | TEMPERATURE: 97.7 F | HEIGHT: 65 IN | OXYGEN SATURATION: 100 % | WEIGHT: 109 LBS

## 2021-03-29 DIAGNOSIS — K59.04 CHRONIC IDIOPATHIC CONSTIPATION: ICD-10-CM

## 2021-03-29 LAB — B-HCG UR QL: NEGATIVE

## 2021-03-29 PROCEDURE — 45380 COLONOSCOPY AND BIOPSY: CPT | Performed by: INTERNAL MEDICINE

## 2021-03-29 PROCEDURE — 81025 URINE PREGNANCY TEST: CPT | Performed by: INTERNAL MEDICINE

## 2021-03-29 PROCEDURE — 25010000002 PROPOFOL 10 MG/ML EMULSION: Performed by: NURSE ANESTHETIST, CERTIFIED REGISTERED

## 2021-03-29 RX ORDER — DEXTROSE AND SODIUM CHLORIDE 5; .45 G/100ML; G/100ML
30 INJECTION, SOLUTION INTRAVENOUS CONTINUOUS PRN
Status: DISCONTINUED | OUTPATIENT
Start: 2021-03-29 | End: 2021-03-29 | Stop reason: HOSPADM

## 2021-03-29 RX ORDER — DEXTROSE AND SODIUM CHLORIDE 5; .45 G/100ML; G/100ML
INJECTION, SOLUTION INTRAVENOUS CONTINUOUS PRN
Status: DISCONTINUED | OUTPATIENT
Start: 2021-03-29 | End: 2021-03-29 | Stop reason: SURG

## 2021-03-29 RX ORDER — PROPOFOL 10 MG/ML
VIAL (ML) INTRAVENOUS AS NEEDED
Status: DISCONTINUED | OUTPATIENT
Start: 2021-03-29 | End: 2021-03-29 | Stop reason: SURG

## 2021-03-29 RX ORDER — LIDOCAINE HYDROCHLORIDE 20 MG/ML
INJECTION, SOLUTION INTRAVENOUS AS NEEDED
Status: DISCONTINUED | OUTPATIENT
Start: 2021-03-29 | End: 2021-03-29 | Stop reason: SURG

## 2021-03-29 RX ADMIN — PROPOFOL 20 MG: 10 INJECTION, EMULSION INTRAVENOUS at 11:22

## 2021-03-29 RX ADMIN — PROPOFOL 20 MG: 10 INJECTION, EMULSION INTRAVENOUS at 11:26

## 2021-03-29 RX ADMIN — DEXTROSE AND SODIUM CHLORIDE 30 ML/HR: 5; 450 INJECTION, SOLUTION INTRAVENOUS at 10:16

## 2021-03-29 RX ADMIN — PROPOFOL 100 MG: 10 INJECTION, EMULSION INTRAVENOUS at 11:19

## 2021-03-29 RX ADMIN — LIDOCAINE HYDROCHLORIDE 50 MG: 20 INJECTION, SOLUTION INTRAVENOUS at 11:19

## 2021-03-29 RX ADMIN — DEXTROSE AND SODIUM CHLORIDE: 5; 450 INJECTION, SOLUTION INTRAVENOUS at 11:00

## 2021-03-29 NOTE — ANESTHESIA PREPROCEDURE EVALUATION
Anesthesia Evaluation     Patient summary reviewed and Nursing notes reviewed                Airway   Mallampati: II  TM distance: >3 FB  Neck ROM: full  No difficulty expected  Dental - normal exam     Pulmonary - normal exam   (+) asthma,  Cardiovascular - negative cardio ROS and normal exam  Exercise tolerance: good (4-7 METS)        Neuro/Psych  (+) headaches, psychiatric history ADHD,     GI/Hepatic/Renal/Endo    (+)  GI bleeding ,     Musculoskeletal (-) negative ROS    Abdominal  - normal exam   Substance History - negative use     OB/GYN negative ob/gyn ROS         Other                        Anesthesia Plan    ASA 1     MAC     intravenous induction     Anesthetic plan, all risks, benefits, and alternatives have been provided, discussed and informed consent has been obtained with: patient.    Plan discussed with CRNA.

## 2021-03-29 NOTE — ANESTHESIA POSTPROCEDURE EVALUATION
Patient: Kate New    Procedure Summary     Date: 03/29/21 Room / Location: [facility] ENDOSCOPY 3 / [facility] ENDOSCOPY    Anesthesia Start: 1109 Anesthesia Stop: 1133    Procedure: COLONOSCOPY (N/A ) Diagnosis:       Chronic idiopathic constipation      (Chronic idiopathic constipation [K59.04])    Surgeons: Juan Johnson MD Provider: Symone Son CRNA    Anesthesia Type: MAC ASA Status: 1          Anesthesia Type: MAC    Vitals  No vitals data found for the desired time range.          Post Anesthesia Care and Evaluation    Patient location during evaluation: bedside  Patient participation: complete - patient participated  Level of consciousness: lethargic  Pain management: adequate  Airway patency: patent  Anesthetic complications: No anesthetic complications  PONV Status: none  Cardiovascular status: acceptable  Respiratory status: acceptable  Hydration status: acceptable

## 2021-03-30 LAB
LAB AP CASE REPORT: NORMAL
PATH REPORT.FINAL DX SPEC: NORMAL

## 2021-04-22 ENCOUNTER — OFFICE VISIT (OUTPATIENT)
Dept: GASTROENTEROLOGY | Facility: CLINIC | Age: 26
End: 2021-04-22

## 2021-04-22 VITALS
SYSTOLIC BLOOD PRESSURE: 108 MMHG | BODY MASS INDEX: 17.66 KG/M2 | HEIGHT: 65 IN | DIASTOLIC BLOOD PRESSURE: 71 MMHG | HEART RATE: 118 BPM | WEIGHT: 106 LBS

## 2021-04-22 DIAGNOSIS — K59.04 CHRONIC IDIOPATHIC CONSTIPATION: Primary | ICD-10-CM

## 2021-04-22 PROCEDURE — 99214 OFFICE O/P EST MOD 30 MIN: CPT | Performed by: INTERNAL MEDICINE

## 2021-04-22 NOTE — PROGRESS NOTES
[facility], [facility]      Chief Complaint:   Chief Complaint   Patient presents with   • Follow-up     colon       Subjective     HPI:   Patient with irritable bowel syndrome with constipation.  Patient has been on Linzess with some mild improvement in constipation.  Patient is still on Suboxone which is probably making her constipation worse.  Discussed with patient that getting off Suboxone probably will help some.  Also discussed taking a tablespoon of Benefiber daily.  Patient states her mother had a colonoscopy which showed patient's family needs to have a colonoscopy every 5 years.    Plan; we will schedule patient for follow-up in 3 months continue patient on Linzess add Benefiber to patient's diet.    Past Medical History:   Past Medical History:   Diagnosis Date   • Allergic    • Anemia    • Anxiety    • Asthma    • Constipation 03/25/2021   • Headache    • Migraine    • Penicillin allergy    • Rectal bleeding 03/25/2021   • Smoker    • Substance abuse (CMS/[facility])    • Substance abuse (CMS/[facility])        Past Surgical History:  Past Surgical History:   Procedure Laterality Date   • COLONOSCOPY N/A 3/29/2021    Procedure: COLONOSCOPY;  Surgeon: Juan Johnson MD;  Location: [facility] ENDOSCOPY;  Service: Gastroenterology;  Laterality: N/A;   • FACIAL LACERATIONS REPAIR     • MOUTH SURGERY     • WISDOM TOOTH EXTRACTION         Family History:  Family History   Problem Relation Age of Onset   • Depression Mother    • Hypothyroidism Mother    • Thyroid disease Mother    • Colon polyps Mother    • COPD Maternal Grandmother    • Breast cancer Maternal Grandmother    • Hyperlipidemia Father    • Post-traumatic stress disorder Father    • No Known Problems Sister    • Alcohol abuse Paternal Grandfather    • Cancer Paternal Grandmother    • Colon polyps Maternal Grandfather        Social History:   reports that she has been smoking cigarettes. She started smoking about 14 years ago. She has a 3.96 pack-year  smoking history. She has never used smokeless tobacco. She reports previous drug use. Drug: Heroin. She reports that she does not drink alcohol.    Medications:   Prior to Admission medications    Medication Sig Start Date End Date Taking? Authorizing Provider   albuterol sulfate  (90 Base) MCG/ACT inhaler Inhale 1 puff 4 (Four) Times a Day.  Patient taking differently: Inhale 1 puff 4 (Four) Times a Day As Needed. 1/9/20  Yes Cookie Villa MD   amphetamine-dextroamphetamine (Adderall) 30 MG tablet Take 30 mg by mouth 2 (Two) Times a Day. 12/14/20  Yes Kaya Lux MD   buprenorphine-naloxone (SUBOXONE) 8-2 MG per SL tablet Place 1 tablet under the tongue 2 (two) times a day. 3/5/20  Yes Kaya Lux MD   dicyclomine (BENTYL) 20 MG tablet Take 20 mg by mouth 2 (two) times a day. 1/6/21  Yes Provider, Historical, MD   GNP Childrens Ibuprofen 100 MG/5ML suspension  1/8/21  Yes Kaya Lux MD   linaclotide (LINZESS) 290 MCG capsule capsule Take 1 capsule by mouth Every Morning Before Breakfast. 3/26/21  Yes Juan Johnson MD   polyethylene glycol (MIRALAX) powder powder Take 17 g by mouth Daily.  Patient taking differently: Take 17 g by mouth Daily As Needed. 6/19/20  Yes Jessica Mishra APRN   buPROPion XL (WELLBUTRIN XL) 150 MG 24 hr tablet  12/17/20   Kaya Lux MD   Prenatal Vit-Fe Fumarate-FA (MYNATAL PLUS) tablet Take 1 tablet by mouth Daily. 1/9/20   Cookie Villa MD   Plecanatide (Trulance) 3 MG tablet Take 1 tablet by mouth Daily. 3/25/21 4/22/21  Juan Johnson MD       Allergies:  Nickel and Penicillins    ROS:    Review of Systems   Constitutional: Negative for activity change, appetite change, chills, diaphoresis, fatigue, fever and unexpected weight change.   HENT: Negative for sore throat and trouble swallowing.    Respiratory: Negative for shortness of breath.    Gastrointestinal: Positive for constipation. Negative  "for abdominal distention, abdominal pain, anal bleeding, blood in stool, diarrhea, nausea, rectal pain and vomiting.   Endocrine: Negative for polydipsia, polyphagia and polyuria.   Genitourinary: Negative for difficulty urinating.   Musculoskeletal: Negative for arthralgias.   Skin: Negative for pallor.   Allergic/Immunologic: Negative for food allergies.   Neurological: Negative for weakness and light-headedness.   Psychiatric/Behavioral: Negative for behavioral problems.     Objective     Blood pressure 108/71, pulse 118, height 165.1 cm (65\"), weight 48.1 kg (106 lb), last menstrual period 04/22/2021, currently breastfeeding.    Physical Exam  Constitutional:       General: She is not in acute distress.     Appearance: She is well-developed. She is not diaphoretic.   HENT:      Head: Normocephalic and atraumatic.   Cardiovascular:      Rate and Rhythm: Normal rate and regular rhythm.      Heart sounds: Normal heart sounds. No murmur heard.   No friction rub. No gallop.    Pulmonary:      Effort: No respiratory distress.      Breath sounds: Normal breath sounds. No wheezing or rales.   Chest:      Chest wall: No tenderness.   Abdominal:      General: Bowel sounds are normal. There is no distension.      Palpations: Abdomen is soft. There is no mass.      Tenderness: There is no abdominal tenderness. There is no guarding or rebound.      Hernia: No hernia is present.   Musculoskeletal:         General: Normal range of motion.   Skin:     General: Skin is warm and dry.      Coloration: Skin is not pale.      Findings: No erythema or rash.   Neurological:      Mental Status: She is alert and oriented to person, place, and time.   Psychiatric:         Behavior: Behavior normal.         Thought Content: Thought content normal.         Judgment: Judgment normal.          Assessment/Plan   Diagnoses and all orders for this visit:    1. Chronic idiopathic constipation (Primary)        * Surgery not found *     Diagnosis " Plan   1. Chronic idiopathic constipation         Anticipated Surgical Procedure:  No orders of the defined types were placed in this encounter.      The risks, benefits, and alternatives of this procedure have been discussed with the patient or the responsible party- the patient understands and agrees to proceed.

## 2021-05-07 ENCOUNTER — TRANSCRIBE ORDERS (OUTPATIENT)
Dept: GENERAL RADIOLOGY | Facility: CLINIC | Age: 26
End: 2021-05-07

## 2021-05-07 DIAGNOSIS — E55.9 VITAMIN D DEFICIENCY, UNSPECIFIED: ICD-10-CM

## 2021-05-07 DIAGNOSIS — M79.671 RIGHT FOOT PAIN: ICD-10-CM

## 2021-05-07 DIAGNOSIS — R09.89 PULMONARY CONGESTION: ICD-10-CM

## 2021-05-07 DIAGNOSIS — I95.9 HYPOTENSION, UNSPECIFIED HYPOTENSION TYPE: Primary | ICD-10-CM

## 2021-05-07 DIAGNOSIS — R23.8 VESICULAR ERUPTION OF SKIN: ICD-10-CM

## 2021-05-07 DIAGNOSIS — M79.672 LEFT FOOT PAIN: ICD-10-CM

## 2021-05-07 DIAGNOSIS — D51.9 ANEMIA DUE TO VITAMIN B12 DEFICIENCY, UNSPECIFIED B12 DEFICIENCY TYPE: ICD-10-CM

## 2021-05-13 ENCOUNTER — OFFICE VISIT (OUTPATIENT)
Dept: OBSTETRICS AND GYNECOLOGY | Facility: CLINIC | Age: 26
End: 2021-05-13

## 2021-05-13 VITALS
WEIGHT: 106 LBS | DIASTOLIC BLOOD PRESSURE: 62 MMHG | HEIGHT: 62 IN | SYSTOLIC BLOOD PRESSURE: 98 MMHG | BODY MASS INDEX: 19.51 KG/M2

## 2021-05-13 DIAGNOSIS — N92.1 BREAKTHROUGH BLEEDING ON NEXPLANON: Primary | ICD-10-CM

## 2021-05-13 DIAGNOSIS — Z97.5 BREAKTHROUGH BLEEDING ON NEXPLANON: Primary | ICD-10-CM

## 2021-05-13 PROCEDURE — 99214 OFFICE O/P EST MOD 30 MIN: CPT | Performed by: OBSTETRICS & GYNECOLOGY

## 2021-05-13 RX ORDER — NORETHINDRONE ACETATE AND ETHINYL ESTRADIOL 1MG-20(21)
KIT ORAL
Qty: 84 TABLET | Refills: 3 | Status: SHIPPED | OUTPATIENT
Start: 2021-05-13 | End: 2021-06-28

## 2021-05-13 NOTE — PROGRESS NOTES
[facility]  Gynecology  Date of Service: 2021    CC: Bleeding issues    HPI  Kate New is a 25 y.o.  premenopausal female who presents with complaints of bleeding issues with the Nexplanon.      She had the Nexplanon placed in 2020.  She states that she had no issues and no bleeding until January.  She said that she would bleed for 3 weeks, then 1 week off, then bleed for another 3 weeks with 1 week off and now has continued to bleed since that time.  She states that she changes her pad or tampon every 3h.  Denies significant cramping.    She is being worked up for issues with her feet and toes turning blue/purple.  She had a doppler recently which was negative for DVT.    ROS  Review of Systems   Constitutional: Negative.    HENT: Negative.    Eyes: Negative.    Respiratory: Negative.    Cardiovascular: Negative.    Gastrointestinal: Negative.    Endocrine: Negative.    Genitourinary: Negative.    Musculoskeletal: Negative.    Skin: Negative.    Neurological: Negative.    Hematological: Negative.    Psychiatric/Behavioral: Negative.       OB HISTORY  OB History    Para Term  AB Living   2 1 1   1 1   SAB TAB Ectopic Molar Multiple Live Births   1       0 1      # Outcome Date GA Lbr Charanjit/2nd Weight Sex Delivery Anes PTL Lv   2 Term 20 39w4d / 00:28 2800 g (6 lb 2.8 oz) M Vag-Spont EPI N RICKY   1 SAB 2018             PAST MEDICAL HISTORY  Past Medical History:   Diagnosis Date   • Allergic    • Anemia    • Anxiety    • Asthma    • Constipation 2021   • Headache    • Migraine    • Penicillin allergy    • Rectal bleeding 2021   • Smoker    • Substance abuse (CMS/[facility])    • Substance abuse (CMS/[facility])      PAST SURGICAL HISTORY  Past Surgical History:   Procedure Laterality Date   • COLONOSCOPY N/A 3/29/2021    Procedure: COLONOSCOPY;  Surgeon: Juan Johnson MD;  Location: [facility] ENDOSCOPY;  Service: Gastroenterology;  Laterality: N/A;    • FACIAL LACERATIONS REPAIR     • MOUTH SURGERY     • WISDOM TOOTH EXTRACTION       FAMILY HISTORY  Family History   Problem Relation Age of Onset   • Depression Mother    • Hypothyroidism Mother    • Thyroid disease Mother    • Colon polyps Mother    • COPD Maternal Grandmother    • Breast cancer Maternal Grandmother    • Hyperlipidemia Father    • Post-traumatic stress disorder Father    • No Known Problems Sister    • Alcohol abuse Paternal Grandfather    • Cancer Paternal Grandmother    • Colon polyps Maternal Grandfather      SOCIAL HISTORY  Social History     Socioeconomic History   • Marital status:      Spouse name: Not on file   • Number of children: Not on file   • Years of education: Not on file   • Highest education level: Not on file   Tobacco Use   • Smoking status: Current Every Day Smoker     Packs/day: 0.33     Years: 12.00     Pack years: 3.96     Types: Cigarettes     Start date:      Last attempt to quit: 2019     Years since quittin.3   • Smokeless tobacco: Never Used   • Tobacco comment: 2021 - Patient confirms she now utilizes an Electronic Cigarett.    Vaping Use   • Vaping Use: Every day   • Substances: Nicotine, Flavoring   • Devices: Pre-filled pod   Substance and Sexual Activity   • Alcohol use: No   • Drug use: Not Currently     Types: Heroin     Comment: 2021 - Patinet states last utilization of Herion approximately 5.5 years prior.    • Sexual activity: Yes     Partners: Male     Birth control/protection: Nexplanon     ALLERGIES  Allergies   Allergen Reactions   • Nickel Rash   • Penicillins Unknown - Low Severity     Parents are allergic to it     HOME MEDICATIONS  Prior to Admission medications    Medication Sig Start Date End Date Taking? Authorizing Provider   albuterol sulfate  (90 Base) MCG/ACT inhaler Inhale 1 puff 4 (Four) Times a Day.  Patient taking differently: Inhale 1 puff 4 (Four) Times a Day As Needed. 20  Yes Cookie Villa  "MD Blank   amphetamine-dextroamphetamine (Adderall) 30 MG tablet Take 30 mg by mouth 2 (Two) Times a Day. 20  Yes Kaya Lux MD   buprenorphine-naloxone (SUBOXONE) 8-2 MG per SL tablet Place 1 tablet under the tongue 2 (two) times a day. 3/5/20  Yes Kaya Lux MD   dicyclomine (BENTYL) 20 MG tablet Take 20 mg by mouth 2 (two) times a day. 21  Yes Kaya Lux MD   linaclotide (LINZESS) 290 MCG capsule capsule Take 1 capsule by mouth Every Morning Before Breakfast. 3/26/21  Yes Juan Johnson MD   polyethylene glycol (MIRALAX) powder powder Take 17 g by mouth Daily.  Patient taking differently: Take 17 g by mouth Daily As Needed. 20  Yes Jessica Mishra APRN   buPROPion XL (WELLBUTRIN XL) 150 MG 24 hr tablet  20  Kaya Lux MD   GNP Childrens Ibuprofen 100 MG/5ML suspension  21  Kaya Lux MD   Prenatal Vit-Fe Fumarate-FA (MYNATAL PLUS) tablet Take 1 tablet by mouth Daily. 20  Cookie Villa MD     PE  BP 98/62   Ht 157.5 cm (62\")   Wt 48.1 kg (106 lb)   LMP 2021   BMI 19.39 kg/m²        General: Alert, healthy, no distress, well nourished and well developed.  Neurologic: Alert, oriented to person, place, and time.  Gait normal.  Cranial nerves II-XII grossly intact.  HEENT: Normocephalic, atraumatic.  Extraocular muscles intact, pupils equal and reactive times two.    Neck: Supple, no adenopathy, thyroid normal size, non-tender, without nodularity, trachea midline.  Lungs: Normal respiratory effort.  Clear to auscultation bilaterally.  No wheezes, rhonci, or rales.  Heart: Regular rate and rhythm.  No murmer, rub or gallop.  Abdomen: Soft, non-tender, non-distended,no masses, no hepatosplenomegaly, no hernia.  Skin: No rash, no lesions.  Extremities: No cyanosis, clubbing or edema.    IMPRESSION  Kate New is a 25 y.o.  presenting with breakthrough bleeding " with Nexplanon.    PLAN    1. Breakthrough bleeding on Nexplanon  She had labwork done at outside facility so will obtain records.  Recommend OCPs to regulate menses; patient agreeable.  Will obtain US to r/o structural etiology.  RTC 3 months.  - US Non-ob Transvaginal; Future  - norethindrone-ethinyl estradiol FE (Junel FE 1/20) 1-20 MG-MCG per tablet; Take 3 weeks then skip to next pack.  Dispense: 84 tablet; Refill: 3         This document has been electronically signed by Cookie Villa MD on May 13, 2021 13:26 CDT.

## 2021-06-01 ENCOUNTER — TELEPHONE (OUTPATIENT)
Dept: OBSTETRICS AND GYNECOLOGY | Facility: CLINIC | Age: 26
End: 2021-06-01

## 2021-06-01 NOTE — TELEPHONE ENCOUNTER
Patient called the office spoke with her about results she verbalized understanding and would like to proceed with hysteroscopy d&c.  I told patient I would start paper work and reach out to her tomorrow to discuss dates.

## 2021-06-01 NOTE — TELEPHONE ENCOUNTER
----- Message from Cookie Villa MD sent at 5/27/2021  8:21 AM CDT -----  Please let her know that the ultrasound shows an endometrial polyp which may be the reason why she had bleeding with her Nexplanon.  If she would like, we can do a minor surgery called D&C, hysteroscopy to remove only the polyp to help with her bleeding.

## 2021-06-05 ENCOUNTER — PREP FOR SURGERY (OUTPATIENT)
Dept: OTHER | Facility: HOSPITAL | Age: 26
End: 2021-06-05

## 2021-06-05 DIAGNOSIS — Z97.5 BREAKTHROUGH BLEEDING ON NEXPLANON: ICD-10-CM

## 2021-06-05 DIAGNOSIS — N84.0 ENDOMETRIAL POLYP: Primary | ICD-10-CM

## 2021-06-05 DIAGNOSIS — N92.1 BREAKTHROUGH BLEEDING ON NEXPLANON: ICD-10-CM

## 2021-06-05 RX ORDER — SODIUM CHLORIDE 0.9 % (FLUSH) 0.9 %
10 SYRINGE (ML) INJECTION AS NEEDED
Status: CANCELLED | OUTPATIENT
Start: 2021-06-30

## 2021-06-05 RX ORDER — SODIUM CHLORIDE 0.9 % (FLUSH) 0.9 %
3 SYRINGE (ML) INJECTION EVERY 12 HOURS SCHEDULED
Status: CANCELLED | OUTPATIENT
Start: 2021-06-30

## 2021-06-05 RX ORDER — SODIUM CHLORIDE, SODIUM LACTATE, POTASSIUM CHLORIDE, CALCIUM CHLORIDE 600; 310; 30; 20 MG/100ML; MG/100ML; MG/100ML; MG/100ML
125 INJECTION, SOLUTION INTRAVENOUS CONTINUOUS
Status: CANCELLED | OUTPATIENT
Start: 2021-06-30

## 2021-06-08 PROBLEM — N84.0 ENDOMETRIAL POLYP: Status: ACTIVE | Noted: 2021-06-08

## 2021-06-24 ENCOUNTER — APPOINTMENT (OUTPATIENT)
Dept: PREADMISSION TESTING | Facility: HOSPITAL | Age: 26
End: 2021-06-24

## 2021-06-27 ENCOUNTER — LAB (OUTPATIENT)
Dept: LAB | Facility: HOSPITAL | Age: 26
End: 2021-06-27

## 2021-06-27 DIAGNOSIS — Z01.818 PREOP TESTING: Primary | ICD-10-CM

## 2021-06-27 LAB — SARS-COV-2 N GENE RESP QL NAA+PROBE: NOT DETECTED

## 2021-06-27 PROCEDURE — 87635 SARS-COV-2 COVID-19 AMP PRB: CPT

## 2021-06-27 PROCEDURE — C9803 HOPD COVID-19 SPEC COLLECT: HCPCS

## 2021-06-28 ENCOUNTER — PRE-ADMISSION TESTING (OUTPATIENT)
Dept: PREADMISSION TESTING | Facility: HOSPITAL | Age: 26
End: 2021-06-28

## 2021-06-28 VITALS
RESPIRATION RATE: 16 BRPM | SYSTOLIC BLOOD PRESSURE: 100 MMHG | OXYGEN SATURATION: 95 % | HEART RATE: 99 BPM | BODY MASS INDEX: 17.66 KG/M2 | DIASTOLIC BLOOD PRESSURE: 70 MMHG | HEIGHT: 65 IN | WEIGHT: 106 LBS

## 2021-06-28 DIAGNOSIS — N84.0 ENDOMETRIAL POLYP: ICD-10-CM

## 2021-06-28 LAB
ABO GROUP BLD: NORMAL
ANION GAP SERPL CALCULATED.3IONS-SCNC: 6 MMOL/L (ref 5–15)
BASOPHILS # BLD AUTO: 0.08 10*3/MM3 (ref 0–0.2)
BASOPHILS NFR BLD AUTO: 1.2 % (ref 0–1.5)
BLD GP AB SCN SERPL QL: NEGATIVE
BUN SERPL-MCNC: 12 MG/DL (ref 6–20)
BUN/CREAT SERPL: 20.7 (ref 7–25)
CALCIUM SPEC-SCNC: 9.7 MG/DL (ref 8.6–10.5)
CHLORIDE SERPL-SCNC: 105 MMOL/L (ref 98–107)
CO2 SERPL-SCNC: 26 MMOL/L (ref 22–29)
CREAT SERPL-MCNC: 0.58 MG/DL (ref 0.57–1)
DEPRECATED RDW RBC AUTO: 41.9 FL (ref 37–54)
EOSINOPHIL # BLD AUTO: 0.31 10*3/MM3 (ref 0–0.4)
EOSINOPHIL NFR BLD AUTO: 4.7 % (ref 0.3–6.2)
ERYTHROCYTE [DISTWIDTH] IN BLOOD BY AUTOMATED COUNT: 12.8 % (ref 12.3–15.4)
GFR SERPL CREATININE-BSD FRML MDRD: 127 ML/MIN/1.73
GLUCOSE SERPL-MCNC: 94 MG/DL (ref 65–99)
HCT VFR BLD AUTO: 38.9 % (ref 34–46.6)
HGB BLD-MCNC: 12.9 G/DL (ref 12–15.9)
IMM GRANULOCYTES # BLD AUTO: 0.01 10*3/MM3 (ref 0–0.05)
IMM GRANULOCYTES NFR BLD AUTO: 0.2 % (ref 0–0.5)
LYMPHOCYTES # BLD AUTO: 1.39 10*3/MM3 (ref 0.7–3.1)
LYMPHOCYTES NFR BLD AUTO: 21.1 % (ref 19.6–45.3)
Lab: NORMAL
MCH RBC QN AUTO: 29.4 PG (ref 26.6–33)
MCHC RBC AUTO-ENTMCNC: 33.2 G/DL (ref 31.5–35.7)
MCV RBC AUTO: 88.6 FL (ref 79–97)
MONOCYTES # BLD AUTO: 0.67 10*3/MM3 (ref 0.1–0.9)
MONOCYTES NFR BLD AUTO: 10.2 % (ref 5–12)
NEUTROPHILS NFR BLD AUTO: 4.13 10*3/MM3 (ref 1.7–7)
NEUTROPHILS NFR BLD AUTO: 62.6 % (ref 42.7–76)
NRBC BLD AUTO-RTO: 0 /100 WBC (ref 0–0.2)
PLATELET # BLD AUTO: 237 10*3/MM3 (ref 140–450)
PMV BLD AUTO: 10.2 FL (ref 6–12)
POTASSIUM SERPL-SCNC: 4.3 MMOL/L (ref 3.5–5.2)
RBC # BLD AUTO: 4.39 10*6/MM3 (ref 3.77–5.28)
RH BLD: POSITIVE
SODIUM SERPL-SCNC: 137 MMOL/L (ref 136–145)
T&S EXPIRATION DATE: NORMAL
WBC # BLD AUTO: 6.59 10*3/MM3 (ref 3.4–10.8)

## 2021-06-28 PROCEDURE — 85025 COMPLETE CBC W/AUTO DIFF WBC: CPT

## 2021-06-28 PROCEDURE — 86900 BLOOD TYPING SEROLOGIC ABO: CPT

## 2021-06-28 PROCEDURE — 80048 BASIC METABOLIC PNL TOTAL CA: CPT

## 2021-06-28 PROCEDURE — 86850 RBC ANTIBODY SCREEN: CPT

## 2021-06-28 PROCEDURE — 36415 COLL VENOUS BLD VENIPUNCTURE: CPT

## 2021-06-28 PROCEDURE — 86901 BLOOD TYPING SEROLOGIC RH(D): CPT

## 2021-06-28 RX ORDER — MAGNESIUM CARB/ALUMINUM HYDROX 105-160MG
10 TABLET,CHEWABLE ORAL DAILY
COMMUNITY
End: 2021-11-04

## 2021-06-30 ENCOUNTER — ANESTHESIA EVENT (OUTPATIENT)
Dept: PERIOP | Facility: HOSPITAL | Age: 26
End: 2021-06-30

## 2021-06-30 ENCOUNTER — ANESTHESIA (OUTPATIENT)
Dept: PERIOP | Facility: HOSPITAL | Age: 26
End: 2021-06-30

## 2021-06-30 ENCOUNTER — HOSPITAL ENCOUNTER (OUTPATIENT)
Facility: HOSPITAL | Age: 26
Setting detail: HOSPITAL OUTPATIENT SURGERY
Discharge: HOME | End: 2021-06-30
Attending: OBSTETRICS & GYNECOLOGY | Admitting: OBSTETRICS & GYNECOLOGY

## 2021-06-30 VITALS
HEIGHT: 65 IN | HEART RATE: 69 BPM | RESPIRATION RATE: 18 BRPM | OXYGEN SATURATION: 98 % | DIASTOLIC BLOOD PRESSURE: 57 MMHG | TEMPERATURE: 97.6 F | SYSTOLIC BLOOD PRESSURE: 100 MMHG | BODY MASS INDEX: 17.48 KG/M2 | WEIGHT: 104.94 LBS

## 2021-06-30 DIAGNOSIS — N84.0 ENDOMETRIAL POLYP: ICD-10-CM

## 2021-06-30 LAB
AMPHET+METHAMPHET UR QL: POSITIVE
AMPHETAMINES UR QL: NEGATIVE
B-HCG UR QL: NEGATIVE
BARBITURATES UR QL SCN: NEGATIVE
BENZODIAZ UR QL SCN: NEGATIVE
BUPRENORPHINE SERPL-MCNC: POSITIVE NG/ML
CANNABINOIDS SERPL QL: POSITIVE
COCAINE UR QL: NEGATIVE
METHADONE UR QL SCN: NEGATIVE
OPIATES UR QL: NEGATIVE
OXYCODONE UR QL SCN: NEGATIVE
PCP UR QL SCN: NEGATIVE
PROPOXYPH UR QL: NEGATIVE
TRICYCLICS UR QL SCN: NEGATIVE

## 2021-06-30 PROCEDURE — S0260 H&P FOR SURGERY: HCPCS | Performed by: OBSTETRICS & GYNECOLOGY

## 2021-06-30 PROCEDURE — 25010000002 ONDANSETRON PER 1 MG: Performed by: NURSE ANESTHETIST, CERTIFIED REGISTERED

## 2021-06-30 PROCEDURE — 80306 DRUG TEST PRSMV INSTRMNT: CPT | Performed by: ANESTHESIOLOGY

## 2021-06-30 PROCEDURE — 25010000002 KETOROLAC TROMETHAMINE PER 15 MG: Performed by: NURSE ANESTHETIST, CERTIFIED REGISTERED

## 2021-06-30 PROCEDURE — 25010000002 MIDAZOLAM PER 1 MG: Performed by: NURSE ANESTHETIST, CERTIFIED REGISTERED

## 2021-06-30 PROCEDURE — 81025 URINE PREGNANCY TEST: CPT | Performed by: OBSTETRICS & GYNECOLOGY

## 2021-06-30 PROCEDURE — 25010000002 PROPOFOL 10 MG/ML EMULSION: Performed by: NURSE ANESTHETIST, CERTIFIED REGISTERED

## 2021-06-30 PROCEDURE — 25010000002 DEXAMETHASONE PER 1 MG: Performed by: NURSE ANESTHETIST, CERTIFIED REGISTERED

## 2021-06-30 PROCEDURE — 25010000002 FENTANYL CITRATE (PF) 50 MCG/ML SOLUTION: Performed by: NURSE ANESTHETIST, CERTIFIED REGISTERED

## 2021-06-30 PROCEDURE — 58558 HYSTEROSCOPY BIOPSY: CPT | Performed by: OBSTETRICS & GYNECOLOGY

## 2021-06-30 RX ORDER — ONDANSETRON 2 MG/ML
4 INJECTION INTRAMUSCULAR; INTRAVENOUS ONCE AS NEEDED
Status: DISCONTINUED | OUTPATIENT
Start: 2021-06-30 | End: 2021-06-30 | Stop reason: HOSPADM

## 2021-06-30 RX ORDER — PROMETHAZINE HYDROCHLORIDE 25 MG/1
25 SUPPOSITORY RECTAL ONCE AS NEEDED
Status: DISCONTINUED | OUTPATIENT
Start: 2021-06-30 | End: 2021-06-30 | Stop reason: HOSPADM

## 2021-06-30 RX ORDER — MIDAZOLAM HYDROCHLORIDE 1 MG/ML
INJECTION INTRAMUSCULAR; INTRAVENOUS AS NEEDED
Status: DISCONTINUED | OUTPATIENT
Start: 2021-06-30 | End: 2021-06-30 | Stop reason: SURG

## 2021-06-30 RX ORDER — PROMETHAZINE HYDROCHLORIDE 25 MG/1
25 TABLET ORAL ONCE AS NEEDED
Status: DISCONTINUED | OUTPATIENT
Start: 2021-06-30 | End: 2021-06-30 | Stop reason: HOSPADM

## 2021-06-30 RX ORDER — ACETAMINOPHEN 325 MG/1
650 TABLET ORAL EVERY 4 HOURS PRN
Qty: 30 TABLET | Refills: 0 | Status: SHIPPED | OUTPATIENT
Start: 2021-06-30 | End: 2021-11-04

## 2021-06-30 RX ORDER — PROPOFOL 10 MG/ML
VIAL (ML) INTRAVENOUS AS NEEDED
Status: DISCONTINUED | OUTPATIENT
Start: 2021-06-30 | End: 2021-06-30 | Stop reason: SURG

## 2021-06-30 RX ORDER — DEXAMETHASONE SODIUM PHOSPHATE 4 MG/ML
INJECTION, SOLUTION INTRA-ARTICULAR; INTRALESIONAL; INTRAMUSCULAR; INTRAVENOUS; SOFT TISSUE AS NEEDED
Status: DISCONTINUED | OUTPATIENT
Start: 2021-06-30 | End: 2021-06-30 | Stop reason: SURG

## 2021-06-30 RX ORDER — ONDANSETRON 2 MG/ML
INJECTION INTRAMUSCULAR; INTRAVENOUS AS NEEDED
Status: DISCONTINUED | OUTPATIENT
Start: 2021-06-30 | End: 2021-06-30 | Stop reason: SURG

## 2021-06-30 RX ORDER — FENTANYL CITRATE 50 UG/ML
INJECTION, SOLUTION INTRAMUSCULAR; INTRAVENOUS AS NEEDED
Status: DISCONTINUED | OUTPATIENT
Start: 2021-06-30 | End: 2021-06-30 | Stop reason: SURG

## 2021-06-30 RX ORDER — LIDOCAINE HYDROCHLORIDE 20 MG/ML
INJECTION, SOLUTION INFILTRATION; PERINEURAL AS NEEDED
Status: DISCONTINUED | OUTPATIENT
Start: 2021-06-30 | End: 2021-06-30 | Stop reason: SURG

## 2021-06-30 RX ORDER — IBUPROFEN 600 MG/1
600 TABLET ORAL EVERY 6 HOURS PRN
Status: CANCELLED | OUTPATIENT
Start: 2021-06-30

## 2021-06-30 RX ORDER — IBUPROFEN 600 MG/1
600 TABLET ORAL EVERY 6 HOURS PRN
Qty: 30 TABLET | Refills: 0 | Status: SHIPPED | OUTPATIENT
Start: 2021-06-30 | End: 2021-11-04

## 2021-06-30 RX ORDER — SODIUM CHLORIDE 0.9 % (FLUSH) 0.9 %
10 SYRINGE (ML) INJECTION AS NEEDED
Status: DISCONTINUED | OUTPATIENT
Start: 2021-06-30 | End: 2021-06-30 | Stop reason: HOSPADM

## 2021-06-30 RX ORDER — SODIUM CHLORIDE, SODIUM LACTATE, POTASSIUM CHLORIDE, CALCIUM CHLORIDE 600; 310; 30; 20 MG/100ML; MG/100ML; MG/100ML; MG/100ML
125 INJECTION, SOLUTION INTRAVENOUS CONTINUOUS
Status: DISCONTINUED | OUTPATIENT
Start: 2021-06-30 | End: 2021-06-30 | Stop reason: HOSPADM

## 2021-06-30 RX ORDER — KETOROLAC TROMETHAMINE 30 MG/ML
INJECTION, SOLUTION INTRAMUSCULAR; INTRAVENOUS AS NEEDED
Status: DISCONTINUED | OUTPATIENT
Start: 2021-06-30 | End: 2021-06-30 | Stop reason: SURG

## 2021-06-30 RX ORDER — MEPERIDINE HYDROCHLORIDE 25 MG/ML
12.5 INJECTION INTRAMUSCULAR; INTRAVENOUS; SUBCUTANEOUS
Status: DISCONTINUED | OUTPATIENT
Start: 2021-06-30 | End: 2021-06-30 | Stop reason: HOSPADM

## 2021-06-30 RX ORDER — SODIUM CHLORIDE 0.9 % (FLUSH) 0.9 %
3 SYRINGE (ML) INJECTION EVERY 12 HOURS SCHEDULED
Status: DISCONTINUED | OUTPATIENT
Start: 2021-06-30 | End: 2021-06-30 | Stop reason: HOSPADM

## 2021-06-30 RX ADMIN — SODIUM CHLORIDE, POTASSIUM CHLORIDE, SODIUM LACTATE AND CALCIUM CHLORIDE 125 ML/HR: 600; 310; 30; 20 INJECTION, SOLUTION INTRAVENOUS at 09:28

## 2021-06-30 RX ADMIN — PROPOFOL 100 MG: 10 INJECTION, EMULSION INTRAVENOUS at 11:12

## 2021-06-30 RX ADMIN — LIDOCAINE HYDROCHLORIDE 50 MG: 20 INJECTION, SOLUTION INFILTRATION; PERINEURAL at 11:12

## 2021-06-30 RX ADMIN — DEXAMETHASONE SODIUM PHOSPHATE 4 MG: 4 INJECTION, SOLUTION INTRAMUSCULAR; INTRAVENOUS at 11:21

## 2021-06-30 RX ADMIN — FENTANYL CITRATE 50 MCG: 50 INJECTION INTRAMUSCULAR; INTRAVENOUS at 11:13

## 2021-06-30 RX ADMIN — KETOROLAC TROMETHAMINE 30 MG: 30 INJECTION, SOLUTION INTRAMUSCULAR; INTRAVENOUS at 11:37

## 2021-06-30 RX ADMIN — FENTANYL CITRATE 50 MCG: 50 INJECTION INTRAMUSCULAR; INTRAVENOUS at 11:48

## 2021-06-30 RX ADMIN — ONDANSETRON 4 MG: 2 INJECTION INTRAMUSCULAR; INTRAVENOUS at 11:24

## 2021-06-30 RX ADMIN — MIDAZOLAM HYDROCHLORIDE 2 MG: 2 INJECTION, SOLUTION INTRAMUSCULAR; INTRAVENOUS at 11:07

## 2021-06-30 NOTE — ANESTHESIA POSTPROCEDURE EVALUATION
Patient: Kate New    Procedure Summary     Date: 06/30/21 Room / Location: [facility] OR [address] [place] [address] OR    Anesthesia Start: 1108 Anesthesia Stop: 1200    Procedure: Dilation and curettage, hysteroscopy, polypectomy (N/A Uterus) Diagnosis:       Endometrial polyp      (Endometrial polyp [N84.0])    Surgeons: Cookie Villa MD Provider: Deb Tellez DO    Anesthesia Type: general ASA Status: 3          Anesthesia Type: general    Vitals  Vitals Value Taken Time   /62 06/30/21 1155   Temp 98.6 °F (37 °C) 06/30/21 1155   Pulse 84 06/30/21 1155   Resp 16 06/30/21 1155   SpO2 100 % 06/30/21 1155           Post Anesthesia Care and Evaluation    Patient location during evaluation: PACU  Patient participation: complete - patient participated  Level of consciousness: awake and alert  Pain score: 1  Pain management: adequate  Airway patency: patent  Anesthetic complications: No anesthetic complications  PONV Status: none  Cardiovascular status: acceptable  Respiratory status: acceptable  Hydration status: acceptable

## 2021-06-30 NOTE — ANESTHESIA PROCEDURE NOTES
Airway  Urgency: elective    Date/Time: 6/30/2021 11:14 AM    General Information and Staff    Patient location during procedure: OR    Indications and Patient Condition  Indications for airway management: airway protection    Preoxygenated: yes  Mask difficulty assessment: 1 - vent by mask    Final Airway Details  Final airway type: supraglottic airway      Successful airway: I-gel  Size 3    Cormack-Lehane Classification: grade I - full view of glottis  Number of attempts at approach: 1  Assessment: lips, teeth, and gum same as pre-op and atraumatic intubation

## 2021-06-30 NOTE — ANESTHESIA PREPROCEDURE EVALUATION
Anesthesia Evaluation     no history of anesthetic complications:  NPO Solid Status: > 8 hours  NPO Liquid Status: > 2 hours           Airway   Mallampati: I  TM distance: >3 FB  Neck ROM: full  No difficulty expected  Dental - normal exam     Pulmonary - normal exam    breath sounds clear to auscultation  (+) a smoker (E cig) Current Abstained day of surgery, asthma,  (-) sleep apnea  Cardiovascular - negative cardio ROS and normal exam  Exercise tolerance: good (4-7 METS)    Rhythm: regular  Rate: normal    (-) hypertension, valvular problems/murmurs, dysrhythmias, angina, cardiac stents, DVT, hyperlipidemia      Neuro/Psych  (+) headaches (chronic migraines 4xper week), psychiatric history ADHD,     (-) seizures, TIA, CVA, weakness, numbness  GI/Hepatic/Renal/Endo    (-) GERD, hepatitis, liver disease, no renal disease, diabetes, no thyroid disorder    Musculoskeletal (-) negative ROS    Abdominal    Substance History   (-) alcohol use, drug use     OB/GYN    (-)  Pregnant        Other        ROS/Med Hx Other: suboxone did not take today                  Anesthesia Plan    ASA 3     general     intravenous induction     Anesthetic plan, all risks, benefits, and alternatives have been provided, discussed and informed consent has been obtained with: patient.

## 2021-07-01 LAB
LAB AP CASE REPORT: NORMAL
PATH REPORT.FINAL DX SPEC: NORMAL

## 2021-07-13 ENCOUNTER — OFFICE VISIT (OUTPATIENT)
Dept: OBSTETRICS AND GYNECOLOGY | Facility: CLINIC | Age: 26
End: 2021-07-13

## 2021-07-13 DIAGNOSIS — Z97.5 BREAKTHROUGH BLEEDING ON NEXPLANON: ICD-10-CM

## 2021-07-13 DIAGNOSIS — N84.0 ENDOMETRIAL POLYP: ICD-10-CM

## 2021-07-13 DIAGNOSIS — Z09 POSTOPERATIVE FOLLOW-UP: Primary | ICD-10-CM

## 2021-07-13 DIAGNOSIS — N92.1 BREAKTHROUGH BLEEDING ON NEXPLANON: ICD-10-CM

## 2021-07-13 PROCEDURE — 99441 PR PHYS/QHP TELEPHONE EVALUATION 5-10 MIN: CPT | Performed by: OBSTETRICS & GYNECOLOGY

## 2021-07-13 RX ORDER — NORETHINDRONE AND ETHINYL ESTRADIOL 1 MG-35MCG
1 KIT ORAL DAILY
Qty: 28 TABLET | Refills: 12 | Status: SHIPPED | OUTPATIENT
Start: 2021-07-13 | End: 2022-04-27 | Stop reason: SDUPTHER

## 2021-07-13 NOTE — PROGRESS NOTES
[facility]  Gynecology  Post-operative Visit    DOS: 2021  Pre-op diagnosis: BTB w/ Nexplanon, endometrial polyp on US  Procedure: D&C, hysteroscopy  Pathology:   Endometrial curettings: Limited specimen. Scant fragments of endocervical and lower uterine segment mucosa with minimal, detached superficial fragments of benign appearing endometrium.    Patient presents for post-op visit.  She states she is doing well.  She has had a few days of heavy bleeding, that has resolved.    A/P: Kate New is a 25 y.o.  s/p D&C, hysteroscopy on .  Doing well.  - Reviewed pathology  - Continue OCPs, assess at next visit scheduled for August    This document has been electronically signed by Cookie Villa MD on 2021 12:02 CDT.    This visit has been rescheduled as a phone visit to comply with patient safety concerns in accordance with CDC recommendations.  Total time of discussion was 7 minutes.  The use of a telephone visit has been reviewed with the patient and verbal informed consent has been obtained.

## 2021-07-29 ENCOUNTER — OFFICE VISIT (OUTPATIENT)
Dept: GASTROENTEROLOGY | Facility: CLINIC | Age: 26
End: 2021-07-29

## 2021-07-29 VITALS
HEART RATE: 97 BPM | HEIGHT: 65 IN | BODY MASS INDEX: 17.33 KG/M2 | WEIGHT: 104 LBS | SYSTOLIC BLOOD PRESSURE: 105 MMHG | DIASTOLIC BLOOD PRESSURE: 66 MMHG

## 2021-07-29 DIAGNOSIS — K59.04 CHRONIC IDIOPATHIC CONSTIPATION: Primary | ICD-10-CM

## 2021-07-29 PROCEDURE — 99214 OFFICE O/P EST MOD 30 MIN: CPT | Performed by: INTERNAL MEDICINE

## 2021-07-29 RX ORDER — PLECANATIDE 3 MG/1
1 TABLET ORAL DAILY
Qty: 90 TABLET | Refills: 3 | Status: SHIPPED | OUTPATIENT
Start: 2021-07-29 | End: 2021-08-25

## 2021-07-29 NOTE — PROGRESS NOTES
[facility]      Chief Complaint:   Chief Complaint   Patient presents with   • Chronic Idiopathic Constipation       Subjective     HPI:   Patient with chronic idiopathic constipation.  Patient has tried Linzess but states that it causes severe diarrhea and abdominal pain and cramping.  Patient cannot take this medication patient is failed Linzess.  Patient still has constipation states that this is very severe nature causing her severe distress.  Patient would like something for this.    Plan; we will schedule patient for follow-up in 3 months we will start patient on Trulance 1 tablet daily    Past Medical History:   Past Medical History:   Diagnosis Date   • ADD (attention deficit disorder)    • Anxiety    • Asthma    • Constipation 03/25/2021   • Migraine    • Substance abuse (CMS/[facility])    • Tobacco abuse    • Wears glasses        Past Surgical History:  Past Surgical History:   Procedure Laterality Date   • COLONOSCOPY N/A 3/29/2021    Procedure: COLONOSCOPY;  Surgeon: Juan Johnson MD;  Location: [facility] ENDOSCOPY;  Service: Gastroenterology;  Laterality: N/A;   • D & C HYSTEROSCOPY N/A 6/30/2021    Procedure: Dilation and curettage, hysteroscopy;  Surgeon: Cookie Villa MD;  Location: [facility] OR;  Service: Gynecology;  Laterality: N/A;   • DILATATION AND CURETTAGE     • FACIAL LACERATIONS REPAIR     • HYSTEROSCOPY     • MOUTH SURGERY     • WISDOM TOOTH EXTRACTION         Family History:  Family History   Problem Relation Age of Onset   • Depression Mother    • Hypothyroidism Mother    • Thyroid disease Mother    • Colon polyps Mother    • COPD Maternal Grandmother    • Breast cancer Maternal Grandmother    • Hyperlipidemia Father    • Post-traumatic stress disorder Father    • No Known Problems Sister    • Alcohol abuse Paternal Grandfather    • Cancer Paternal Grandmother    • Colon polyps Maternal Grandfather        Social History:   reports that she quit smoking  about 2 years ago. Her smoking use included cigarettes. She started smoking about 14 years ago. She has a 2.50 pack-year smoking history. She has never used smokeless tobacco. She reports current drug use. Drugs: Benzodiazepines, Heroin, Marijuana, and Methamphetamines. She reports that she does not drink alcohol.    Medications:   Prior to Admission medications    Medication Sig Start Date End Date Taking? Authorizing Provider   ALBUTEROL SULFATE IN Inhale 2 puffs Every 4 (Four) Hours As Needed.   Yes Kaya Lux MD   amphetamine-dextroamphetamine (Adderall) 30 MG tablet Take 30 mg by mouth 2 (Two) Times a Day. 12/14/20  Yes Kaya Lux MD   buprenorphine-naloxone (SUBOXONE) 8-2 MG per SL tablet Place 1 tablet under the tongue 2 (two) times a day. 3/5/20  Yes Kaya Lux MD   mineral oil liquid Take 10 mL by mouth Daily.   Yes Kaya Lux MD   norethindrone-ethinyl estradiol (Ortho-Novum 1/35, 28,) 1-35 MG-MCG per tablet Take 1 tablet by mouth Daily. 7/13/21 7/13/22 Yes Cookie Villa MD   acetaminophen (TYLENOL) 325 MG tablet Take 2 tablets by mouth Every 4 (Four) Hours As Needed for Mild or Moderate Pain. 6/30/21   Cookie Villa MD   ibuprofen (ADVIL,MOTRIN) 600 MG tablet Take 1 tablet by mouth Every 6 (Six) Hours As Needed for Mild or Moderate Pain **Take with food** 6/30/21   Cookie Villa MD   Plecanatide (Trulance) 3 MG tablet Take 1 tablet by mouth Daily. 7/29/21   Juan Johnson MD       Allergies:  Nickel and Penicillins    ROS:    Review of Systems   Constitutional: Negative for activity change, appetite change, chills, diaphoresis, fatigue, fever and unexpected weight change.   HENT: Negative for sore throat and trouble swallowing.    Respiratory: Negative for shortness of breath.    Gastrointestinal: Positive for constipation. Negative for abdominal distention, abdominal pain, anal bleeding, blood in stool,  "diarrhea, nausea, rectal pain and vomiting.   Endocrine: Negative for polydipsia, polyphagia and polyuria.   Genitourinary: Negative for difficulty urinating.   Musculoskeletal: Negative for arthralgias.   Skin: Negative for pallor.   Allergic/Immunologic: Negative for food allergies.   Neurological: Negative for weakness and light-headedness.   Psychiatric/Behavioral: Negative for behavioral problems.     Objective     Blood pressure 105/66, pulse 97, height 165.1 cm (65\"), weight 47.2 kg (104 lb), not currently breastfeeding.    Physical Exam  Constitutional:       General: She is not in acute distress.     Appearance: She is well-developed. She is not diaphoretic.   HENT:      Head: Normocephalic and atraumatic.   Cardiovascular:      Rate and Rhythm: Normal rate and regular rhythm.      Heart sounds: Normal heart sounds. No murmur heard.   No friction rub. No gallop.    Pulmonary:      Effort: No respiratory distress.      Breath sounds: Normal breath sounds. No wheezing or rales.   Chest:      Chest wall: No tenderness.   Abdominal:      General: Bowel sounds are normal. There is no distension.      Palpations: Abdomen is soft. There is no mass.      Tenderness: There is no abdominal tenderness. There is no guarding or rebound.      Hernia: No hernia is present.   Musculoskeletal:         General: Normal range of motion.   Skin:     General: Skin is warm and dry.      Coloration: Skin is not pale.      Findings: No erythema or rash.   Neurological:      Mental Status: She is alert and oriented to person, place, and time.   Psychiatric:         Behavior: Behavior normal.         Thought Content: Thought content normal.         Judgment: Judgment normal.          Assessment/Plan   Diagnoses and all orders for this visit:    1. Chronic idiopathic constipation (Primary)    Other orders  -     Plecanatide (Trulance) 3 MG tablet; Take 1 tablet by mouth Daily.  Dispense: 90 tablet; Refill: 3        * Surgery not found " *     Diagnosis Plan   1. Chronic idiopathic constipation         Anticipated Surgical Procedure:  No orders of the defined types were placed in this encounter.      The risks, benefits, and alternatives of this procedure have been discussed with the patient or the responsible party- the patient understands and agrees to proceed.

## 2021-07-29 NOTE — PATIENT INSTRUCTIONS
MyPlate from USDA    MyPlate is an outline of a general healthy diet based on the 2010 Dietary Guidelines for Americans, from the U.S. Department of Agriculture (USDA). It sets guidelines for how much food you should eat from each food group based on your age, sex, and level of physical activity.  What are tips for following MyPlate?  To follow MyPlate recommendations:  · Eat a wide variety of fruits and vegetables, grains, and protein foods.  · Serve smaller portions and eat less food throughout the day.  · Limit portion sizes to avoid overeating.  · Enjoy your food.  · Get at least 150 minutes of exercise every week. This is about 30 minutes each day, 5 or more days per week.  It can be difficult to have every meal look like MyPlate. Think about MyPlate as eating guidelines for an entire day, rather than each individual meal.  Fruits and vegetables  · Make half of your plate fruits and vegetables.  · Eat many different colors of fruits and vegetables each day.  · For a 2,000 calorie daily food plan, eat:  ? 2½ cups of vegetables every day.  ? 2 cups of fruit every day.  · 1 cup is equal to:  ? 1 cup raw or cooked vegetables.  ? 1 cup raw fruit.  ? 1 medium-sized orange, apple, or banana.  ? 1 cup 100% fruit or vegetable juice.  ? 2 cups raw leafy greens, such as lettuce, spinach, or kale.  ? ½ cup dried fruit.  Grains  · One fourth of your plate should be grains.  · Make at least half of the grains you eat each day whole grains.  · For a 2,000 calorie daily food plan, eat 6 oz of grains every day.  · 1 oz is equal to:  ? 1 slice bread.  ? 1 cup cereal.  ? ½ cup cooked rice, cereal, or pasta.  Protein  · One fourth of your plate should be protein.  · Eat a wide variety of protein foods, including meat, poultry, fish, eggs, beans, nuts, and tofu.  · For a 2,000 calorie daily food plan, eat 5½ oz of protein every day.  · 1 oz is equal to:  ? 1 oz meat, poultry, or fish.  ? ¼ cup cooked beans.  ? 1 egg.  ? ½ oz nuts  or seeds.  ? 1 Tbsp peanut butter.  Dairy  · Drink fat-free or low-fat (1%) milk.  · Eat or drink dairy as a side to meals.  · For a 2,000 calorie daily food plan, eat or drink 3 cups of dairy every day.  · 1 cup is equal to:  ? 1 cup milk, yogurt, cottage cheese, or soy milk (soy beverage).  ? 2 oz processed cheese.  ? 1½ oz natural cheese.  Fats, oils, salt, and sugars  · Only small amounts of oils are recommended.  · Avoid foods that are high in calories and low in nutritional value (empty calories), like foods high in fat or added sugars.  · Choose foods that are low in salt (sodium). Choose foods that have less than 140 milligrams (mg) of sodium per serving.  · Drink water instead of sugary drinks. Drink enough water each day to keep your urine pale yellow.  Where to find support  · Work with your health care provider or a nutrition specialist (dietitian) to develop a customized eating plan that is right for you.  · Download an nora (mobile application) to help you track your daily food intake.  Where to find more information  · Go to ChooseMyPlate.gov for more information.  Summary  · MyPlate is a general guideline for healthy eating from the USDA. It is based on the 2010 Dietary Guidelines for Americans.  · In general, fruits and vegetables should take up ½ of your plate, grains should take up ¼ of your plate, and protein should take up ¼ of your plate.  This information is not intended to replace advice given to you by your health care provider. Make sure you discuss any questions you have with your health care provider.  Document Revised: 05/21/2020 Document Reviewed: 03/19/2018  Elsevier Patient Education © 2021 Elsevier Inc.  BMI for Adults  What is BMI?  Body mass index (BMI) is a number that is calculated from a person's weight and height. BMI can help estimate how much of a person's weight is composed of fat. BMI does not measure body fat directly. Rather, it is an alternative to procedures that  "directly measure body fat, which can be difficult and expensive.  BMI can help identify people who may be at higher risk for certain medical problems.  What are BMI measurements used for?  BMI is used as a screening tool to identify possible weight problems. It helps determine whether a person is obese, overweight, a healthy weight, or underweight.  BMI is useful for:  · Identifying a weight problem that may be related to a medical condition or may increase the risk for medical problems.  · Promoting changes, such as changes in diet and exercise, to help reach a healthy weight. BMI screening can be repeated to see if these changes are working.  How is BMI calculated?  BMI involves measuring your weight in relation to your height. Both height and weight are measured, and the BMI is calculated from those numbers. This can be done either in English (U[place] or metric measurements. Note that charts and online BMI calculators are available to help you find your BMI quickly and easily without having to do these calculations yourself.  To calculate your BMI in English (U.S[place] measurements:    1. Measure your weight in pounds (lb).  2. Multiply the number of pounds by 703.  ? For example, for a person who weighs 180 lb, multiply that number by 703, which equals 126,540.  3. Measure your height in inches. Then multiply that number by itself to get a measurement called \"inches squared.\"  ? For example, for a person who is 70 inches tall, the \"inches squared\" measurement is 70 inches x 70 inches, which equals 4,900 inches squared.  4. Divide the total from step 2 (number of lb x 703) by the total from step 3 (inches squared): 126,540 ÷ 4,900 = 25.8. This is your BMI.  To calculate your BMI in metric measurements:  1. Measure your weight in kilograms (kg).  2. Measure your height in meters (m). Then multiply that number by itself to get a measurement called \"meters squared.\"  ? For example, for a person who is 1.75 m tall, the " "\"meters squared\" measurement is 1.75 m x 1.75 m, which is equal to 3.1 meters squared.  3. Divide the number of kilograms (your weight) by the meters squared number. In this example: 70 ÷ 3.1 = 22.6. This is your BMI.  What do the results mean?  BMI charts are used to identify whether you are underweight, normal weight, overweight, or obese. The following guidelines will be used:  · Underweight: BMI less than 18.5.  · Normal weight: BMI between 18.5 and 24.9.  · Overweight: BMI between 25 and 29.9.  · Obese: BMI of 30 or above.  Keep these notes in mind:  · Weight includes both fat and muscle, so someone with a muscular build, such as an athlete, may have a BMI that is higher than 24.9. In cases like these, BMI is not an accurate measure of body fat.  · To determine if excess body fat is the cause of a BMI of 25 or higher, further assessments may need to be done by a health care provider.  · BMI is usually interpreted in the same way for men and women.  Where to find more information  For more information about BMI, including tools to quickly calculate your BMI, go to these websites:  · Centers for Disease Control and Prevention: www.cdc.gov  · American Heart Association: www.heart.org  · National Heart, Lung, and Blood [place]: www.nhlbi.nih.gov  Summary  · Body mass index (BMI) is a number that is calculated from a person's weight and height.  · BMI may help estimate how much of a person's weight is composed of fat. BMI can help identify those who may be at higher risk for certain medical problems.  · BMI can be measured using English measurements or metric measurements.  · BMI charts are used to identify whether you are underweight, normal weight, overweight, or obese.  This information is not intended to replace advice given to you by your health care provider. Make sure you discuss any questions you have with your health care provider.  Document Revised: 09/09/2020 Document Reviewed: 07/17/2020  Kylie " Patient Education © 2021 Elsevier Inc.

## 2021-08-10 ENCOUNTER — TELEPHONE (OUTPATIENT)
Dept: GASTROENTEROLOGY | Facility: CLINIC | Age: 26
End: 2021-08-10

## 2021-08-10 NOTE — TELEPHONE ENCOUNTER
"08/10/2021, 1505 - Patient telephoned per this staff member [phone] with notification insurance denial received regarding Prior Authorization for prescription medication Trulance 3 MG Tablets.  Patient made aware she is to have trial and failure with preferred agents Linzess, Amitiza, or Movantik.  Patient stated she has tried and failed 2 different strengths of Linzess.  Patient in agreement to try Amitiza 8 MCG Capsules, 1 capsule by mouth 2 times per day.  Patient made aware prescription medication will [facility].  Patient verbalized understanding.    08/10/2021, 1512 - Patient telephoned per this staff member [phone] with notification Amitiza 8 MCG Capsules displaying \"not preferred\" during submission of prescription medication to [facility].  Patient made aware Movantik 12.5 MG Tablets displaying preferred agent per patient's insurance.  Patient made aware Movantik 12.5 MG Tablets, 1 tablet by mouth daily, #30, 5 renewals will be submitted via E-Scribe to [facility].  Patient instructed to contact office if 12.5 MG Tablets do not alleviate Constipation as dosing can be increased to 25 MG Tablets.  Patient verbalized understanding.  "

## 2021-08-18 ENCOUNTER — DOCUMENTATION (OUTPATIENT)
Dept: GASTROENTEROLOGY | Facility: CLINIC | Age: 26
End: 2021-08-18

## 2021-08-25 RX ORDER — LUBIPROSTONE 24 UG/1
24 CAPSULE ORAL 2 TIMES DAILY WITH MEALS
Qty: 60 CAPSULE | Refills: 5 | Status: SHIPPED | OUTPATIENT
Start: 2021-08-25

## 2021-08-26 ENCOUNTER — OFFICE VISIT (OUTPATIENT)
Dept: OBSTETRICS AND GYNECOLOGY | Facility: CLINIC | Age: 26
End: 2021-08-26

## 2021-08-26 DIAGNOSIS — Z97.5 BREAKTHROUGH BLEEDING ON NEXPLANON: Primary | ICD-10-CM

## 2021-08-26 DIAGNOSIS — N92.1 BREAKTHROUGH BLEEDING ON NEXPLANON: Primary | ICD-10-CM

## 2021-08-26 PROCEDURE — 99441 PR PHYS/QHP TELEPHONE EVALUATION 5-10 MIN: CPT | Performed by: OBSTETRICS & GYNECOLOGY

## 2021-08-26 NOTE — PROGRESS NOTES
[facility]  Gynecology  Date of Service: 2021    CC: Follow-up    HPI  Kate New is a 25 y.o.  premenopausal female who presents for follow-up of BTB with Nexplanon.      She had the Nexplanon placed in 2020.  She presented in May 2021 with bleeding that started in 2021.  She said that she would bleed for 3 weeks, then 1 week off, then bleed for another 3 weeks with 1 week off and now has continued to bleed since that time.  She was given BCP which helped some.  She had an US that showed endometrial polyp and had a D&C, hysteroscopy on 2021 that did NOT show a polyp.  Sampling was benign.  She continued OCPs.    She states that the bleeding stopped when she picked up a new pack at the end of July.  She has not had any bleeding issues since then.  She is happy.    ROS  Review of Systems   Constitutional: Negative.    HENT: Negative.    Eyes: Negative.    Respiratory: Negative.    Cardiovascular: Negative.    Gastrointestinal: Negative.    Endocrine: Negative.    Genitourinary: Negative.    Musculoskeletal: Negative.    Skin: Negative.    Allergic/Immunologic: Negative.    Neurological: Negative.    Hematological: Negative.    Psychiatric/Behavioral: Negative.      OB HISTORY  OB History    Para Term  AB Living   2 1 1   1 1   SAB TAB Ectopic Molar Multiple Live Births   1       0 1      # Outcome Date GA Lbr Charanjit/2nd Weight Sex Delivery Anes PTL Lv   2 Term 20 39w4d / 00:28 2800 g (6 lb 2.8 oz) M Vag-Spont EPI N RICKY   1 SAB 2018             PAST MEDICAL HISTORY  Past Medical History:   Diagnosis Date   • ADD (attention deficit disorder)    • Anxiety    • Asthma    • Constipation 2021   • Migraine    • Substance abuse (CMS/HCC)    • Tobacco abuse    • Wears glasses      PAST SURGICAL HISTORY  Past Surgical History:   Procedure Laterality Date   • COLONOSCOPY N/A 3/29/2021    Procedure: COLONOSCOPY;  Surgeon: Juan Johnson MD;   Location: [facility] ENDOSCOPY;  Service: Gastroenterology;  Laterality: N/A;   • D & C HYSTEROSCOPY N/A 2021    Procedure: Dilation and curettage, hysteroscopy;  Surgeon: Cookie Villa MD;  Location: [facility] OR;  Service: Gynecology;  Laterality: N/A;   • DILATATION AND CURETTAGE     • FACIAL LACERATIONS REPAIR     • HYSTEROSCOPY     • MOUTH SURGERY     • WISDOM TOOTH EXTRACTION       FAMILY HISTORY  Family History   Problem Relation Age of Onset   • Depression Mother    • Hypothyroidism Mother    • Thyroid disease Mother    • Colon polyps Mother    • COPD Maternal Grandmother    • Breast cancer Maternal Grandmother    • Hyperlipidemia Father    • Post-traumatic stress disorder Father    • No Known Problems Sister    • Alcohol abuse Paternal Grandfather    • Cancer Paternal Grandmother    • Colon polyps Maternal Grandfather      SOCIAL HISTORY  Social History     Socioeconomic History   • Marital status:      Spouse name: Not on file   • Number of children: Not on file   • Years of education: Not on file   • Highest education level: Not on file   Tobacco Use   • Smoking status: Former Smoker     Packs/day: 0.25     Years: 10.00     Pack years: 2.50     Types: Cigarettes     Start date:      Quit date: 2019     Years since quittin.6   • Smokeless tobacco: Never Used   • Tobacco comment: e cig   Vaping Use   • Vaping Use: Every day   • Substances: Nicotine, Flavoring   • Devices: Pre-filled pod   Substance and Sexual Activity   • Alcohol use: No   • Drug use: Yes     Types: Benzodiazepines, Heroin, Marijuana, Methamphetamines     [facility] 5.5 years prior.    • Sexual activity: Yes     Partners: Male     Birth control/protection: Condom, Implant, Other     ALLERGIES  Allergies   Allergen Reactions   • Nickel Rash   • Penicillins Unknown - Low Severity     Parents are allergic to it     HOME MEDICATIONS  Prior to  Admission medications    Medication Sig Start Date End Date Taking? Authorizing Provider   acetaminophen (TYLENOL) 325 MG tablet Take 2 tablets by mouth Every 4 (Four) Hours As Needed for Mild or Moderate Pain. 21  Yes Cookie Villa MD   ALBUTEROL SULFATE IN Inhale 2 puffs Every 4 (Four) Hours As Needed.   Yes Kaya Lux MD   amphetamine-dextroamphetamine (Adderall) 30 MG tablet Take 30 mg by mouth 2 (Two) Times a Day. 20  Yes Kaya Lux MD   buprenorphine-naloxone (SUBOXONE) 8-2 MG per SL tablet Place 1 tablet under the tongue 2 (two) times a day. 3/5/20  Yes Kaya Lux MD   ibuprofen (ADVIL,MOTRIN) 600 MG tablet Take 1 tablet by mouth Every 6 (Six) Hours As Needed for Mild or Moderate Pain **Take with food** 21  Yes Cookie Villa MD   lubiprostone (Amitiza) 24 MCG capsule Take 1 capsule by mouth 2 (Two) Times a Day With Meals. 21  Yes Juan Johnson MD   mineral oil liquid Take 10 mL by mouth Daily.   Yes Kaya Lux MD   Naloxegol Oxalate (MOVANTIK) 12.5 MG tablet Take 1 tablet by mouth Every Morning. 8/10/21  Yes Juan Johnson MD   norethindrone-ethinyl estradiol (Ortho-Novum , ,) 1-35 MG-MCG per tablet Take 1 tablet by mouth Daily. 21 Yes Cookie Villa MD     YAYA New is a 25 y.o.  presenting with BTB with Nexplanon, improved.    PLAN    1. Breakthrough bleeding on Nexplanon  - Continue OCPs  - RTC in 2 years for Nexplanon removal ?reinsertion w/ pap smear         This document has been electronically signed by Cookie Villa MD on 2021 10:50 CDT.    This visit has been rescheduled as a phone visit to comply with patient safety concerns in accordance with CDC recommendations.  Total time of discussion was 8 minutes.  The use of a telephone visit has been reviewed with the patient and verbal informed consent has been obtained.

## 2021-09-03 ENCOUNTER — TELEPHONE (OUTPATIENT)
Dept: GASTROENTEROLOGY | Facility: CLINIC | Age: 26
End: 2021-09-03

## 2021-09-03 NOTE — TELEPHONE ENCOUNTER
09/03/2021, 0821 - [facility] [place] telephoned per this staff member [phone].  Spoke with Pharmacy Technician, Jud.  Pharmacy Technician made aware of Prior Authorization approval obtained for prescription medication Amitiza 24 MCG capsules.  Per Pharmacy Technician, patient has $0.00 co-pay.    09/03/2021, 0824 - Patient telephoned per this staff member [phone].  Zero answer.  Voice message submitted with date, time, office contact information, notification pharmacy has been made aware of Prior Authorization approval obtained for prescription medication with pharmacy stating prescription medication has a zero co-pay.  Patient instructed to contact office with questions.

## 2021-11-04 ENCOUNTER — OFFICE VISIT (OUTPATIENT)
Dept: GASTROENTEROLOGY | Facility: CLINIC | Age: 26
End: 2021-11-04

## 2021-11-04 VITALS
DIASTOLIC BLOOD PRESSURE: 60 MMHG | HEART RATE: 110 BPM | WEIGHT: 108 LBS | SYSTOLIC BLOOD PRESSURE: 90 MMHG | HEIGHT: 65 IN | BODY MASS INDEX: 17.99 KG/M2

## 2021-11-04 DIAGNOSIS — K59.04 CHRONIC IDIOPATHIC CONSTIPATION: Primary | ICD-10-CM

## 2021-11-04 DIAGNOSIS — K59.03 DRUG-INDUCED CONSTIPATION: ICD-10-CM

## 2021-11-04 PROCEDURE — 99214 OFFICE O/P EST MOD 30 MIN: CPT | Performed by: INTERNAL MEDICINE

## 2021-11-04 RX ORDER — NALOXEGOL OXALATE 25 MG/1
25 TABLET, FILM COATED ORAL EVERY MORNING
Qty: 30 TABLET | Refills: 5 | Status: SHIPPED | OUTPATIENT
Start: 2021-11-04

## 2021-11-04 NOTE — PATIENT INSTRUCTIONS
Managing the Challenge of Quitting Smoking  Quitting smoking is a physical and mental challenge. You will face cravings, withdrawal symptoms, and temptation. Before quitting, work with your health care provider to make a plan that can help you manage quitting. Preparation can help you quit and keep you from giving in.  How to manage lifestyle changes  Managing stress  Stress can make you want to smoke, and wanting to smoke may cause stress. It is important to find ways to manage your stress. You might try some of the following:  · Practice relaxation techniques.  ? Breathe slowly and deeply, in through your nose and out through your mouth.  ? Listen to music.  ? Soak in a bath or take a shower.  ? Imagine a peaceful place or vacation.  · Get some support.  ? Talk with family or friends about your stress.  ? Join a support group.  ? Talk with a counselor or therapist.  · Get some physical activity.  ? Go for a walk, run, or bike ride.  ? Play a favorite sport.  ? Practice yoga.    Medicines  Talk with your health care provider about medicines that might help you deal with cravings and make quitting easier for you.  Relationships  Social situations can be difficult when you are quitting smoking. To manage this, you can:  · Avoid parties and other social situations where people might be smoking.  · Avoid alcohol.  · Leave right away if you have the urge to smoke.  · Explain to your family and friends that you are quitting smoking. Ask for support and let them know you might be a bit grumpy.  · Plan activities where smoking is not an option.  General instructions  Be aware that many people gain weight after they quit smoking. However, not everyone does. To keep from gaining weight, have a plan in place before you quit and stick to the plan after you quit. Your plan should include:  · Having healthy snacks. When you have a craving, it may help to:  ? Eat popcorn, carrots, celery, or other cut vegetables.  ? Chew  sugar-free gum.  · Changing how you eat.  ? Eat small portion sizes at meals.  ? Eat 4-6 small meals throughout the day instead of 1-2 large meals a day.  ? Be mindful when you eat. Do not watch television or do other things that might distract you as you eat.  · Exercising regularly.  ? Make time to exercise each day. If you do not have time for a long workout, do short bouts of exercise for 5-10 minutes several times a day.  ? Do some form of strengthening exercise, such as weight lifting.  ? Do some exercise that gets your heart beating and causes you to breathe deeply, such as walking fast, running, swimming, or biking. This is very important.  · Drinking plenty of water or other low-calorie or no-calorie drinks. Drink 6-8 glasses of water daily.    How to recognize withdrawal symptoms  Your body and mind may experience discomfort as you try to get used to not having nicotine in your system. These effects are called withdrawal symptoms. They may include:  · Feeling hungrier than normal.  · Having trouble concentrating.  · Feeling irritable or restless.  · Having trouble sleeping.  · Feeling depressed.  · Craving a cigarette.  To manage withdrawal symptoms:  · Avoid places, people, and activities that trigger your cravings.  · Remember why you want to quit.  · Get plenty of sleep.  · Avoid coffee and other caffeinated drinks. These may worsen some of your symptoms.  These symptoms may surprise you. But be assured that they are normal to have when quitting smoking.  How to manage cravings  Come up with a plan for how to deal with your cravings. The plan should include the following:  · A definition of the specific situation you want to deal with.  · An alternative action you will take.  · A clear idea for how this action will help.  · The name of someone who might help you with this.  Cravings usually last for 5-10 minutes. Consider taking the following actions to help you with your plan to deal with  cravings:  · Keep your mouth busy.  ? Chew sugar-free gum.  ? Suck on hard candies or a straw.  ? Brush your teeth.  · Keep your hands and body busy.  ? Change to a different activity right away.  ? Squeeze or play with a ball.  ? Do an activity or a hobby, such as making bead jewelry, practicing needlepoint, or working with wood.  ? Mix up your normal routine.  ? Take a short exercise break. Go for a quick walk or run up and down stairs.  · Focus on doing something kind or helpful for someone else.  · Call a friend or family member to talk during a craving.  · Join a support group.  · Contact a quitline.  Where to find support  To get help or find a support group:  · Call the [employer] [place]'s Smoking Quitline: 0-724-QUIT NOW (333-0419)  · Visit the website of the Substance Abuse and Mental Health Services Administration: www.samhsa.gov  · Text QUIT to SmokefreeTXT: 555590  Where to find more information  Visit these websites to find more information on quitting smoking:  · [employer] [place]: www.smokefree.gov  · American Lung Association: www.lung.org  · American Cancer Society: www.cancer.org  · Centers for Disease Control and Prevention: www.cdc.gov  · American Heart Association: www.heart.org  Contact a health care provider if:  · You want to change your plan for quitting.  · The medicines you are taking are not helping.  · Your eating feels out of control or you cannot sleep.  Get help right away if:  · You feel depressed or become very anxious.  Summary  · Quitting smoking is a physical and mental challenge. You will face cravings, withdrawal symptoms, and temptation to smoke again. Preparation can help you as you go through these challenges.  · Try different techniques to manage stress, handle social situations, and prevent weight gain.  · You can deal with cravings by keeping your mouth busy (such as by chewing gum), keeping your hands and body busy, calling family or friends, or  contacting a quitline for people who want to quit smoking.  · You can deal with withdrawal symptoms by avoiding places where people smoke, getting plenty of rest, and avoiding drinks with caffeine.  This information is not intended to replace advice given to you by your health care provider. Make sure you discuss any questions you have with your health care provider.  Document Revised: 10/06/2020 Document Reviewed: 10/06/2020  [employer] Patient Education © 2021 [employer]  Constipation, Adult  Constipation is when a person has trouble pooping (having a bowel movement). When you have this condition, you may poop fewer than 3 times a week. Your poop (stool) may also be dry, hard, or bigger than normal.  Follow these instructions at home:  Eating and drinking    · Eat foods that have a lot of fiber, such as:  ? Fresh fruits and vegetables.  ? Whole grains.  ? Beans.  · Eat less of foods that are low in fiber and high in fat and sugar, such as:  ? French fries.  ? Hamburgers.  ? Cookies.  ? Candy.  ? Soda.  · Drink enough fluid to keep your pee (urine) pale yellow.    General instructions  · Exercise regularly or as told by your doctor. Try to do 150 minutes of exercise each week.  · Go to the restroom when you feel like you need to poop. Do not hold it in.  · Take over-the-counter and prescription medicines only as told by your doctor. These include any fiber supplements.  · When you poop:  ? Do deep breathing while relaxing your lower belly (abdomen).  ? Relax your pelvic floor. The pelvic floor is a group of muscles that support the rectum, bladder, and intestines (as well as the uterus in women).  · Watch your condition for any changes. Tell your doctor if you notice any.  · Keep all follow-up visits as told by your doctor. This is important.  Contact a doctor if:  · You have pain that gets worse.  · You have a fever.  · You have not pooped for 4 days.  · You vomit.  · You are not hungry.  · You lose  weight.  · You are bleeding from the opening of the butt (anus).  · You have thin, pencil-like poop.  Get help right away if:  · You have a fever, and your symptoms suddenly get worse.  · You leak poop or have blood in your poop.  · Your belly feels hard or bigger than normal (bloated).  · You have very bad belly pain.  · You feel dizzy or you faint.  Summary  · Constipation is when a person poops fewer than 3 times a week, has trouble pooping, or has poop that is dry, hard, or bigger than normal.  · Eat foods that have a lot of fiber.  · Drink enough fluid to keep your pee (urine) pale yellow.  · Take over-the-counter and prescription medicines only as told by your doctor. These include any fiber supplements.  This information is not intended to replace advice given to you by your health care provider. Make sure you discuss any questions you have with your health care provider.  Document Revised: 11/04/2020 Document Reviewed: 11/04/2020  [employer] Patient Education © 2021 [employer]

## 2021-11-04 NOTE — PROGRESS NOTES
[facility]      Chief Complaint:   Chief Complaint   Patient presents with   • Chronic Idiopathic Constipation     3 Month Clinical Appointment        Subjective     HPI:   Patient with chronic idiopathic constipation and constipation secondary to medication.  Patient needs Movantik but insurance company has not covered this.  Patient is still having a bowel movement about every 2 weeks which is extremely painful despite being on Amitiza twice a day.  Patient has failed over-the-counter medications.  Patient is failed Linzess.  Patient denies any improvement in symptoms with these medications.  Patient needs to be on Movantik.    Plan; we will order Movantik and try to get this through her insurance company as patient has failed all of the medications    Past Medical History:   Past Medical History:   Diagnosis Date   • ADD (attention deficit disorder)    • Anxiety    • Asthma    • Constipation 03/25/2021   • Migraine    • Substance abuse (HCC)    • Tobacco abuse    • Wears glasses        Past Surgical History:  Past Surgical History:   Procedure Laterality Date   • COLONOSCOPY N/A 3/29/2021    Procedure: COLONOSCOPY;  Surgeon: Juan Johnson MD;  Location: [facility] ENDOSCOPY;  Service: Gastroenterology;  Laterality: N/A;   • D & C HYSTEROSCOPY N/A 6/30/2021    Procedure: Dilation and curettage, hysteroscopy;  Surgeon: Cookie Villa MD;  Location: [facility] OR;  Service: Gynecology;  Laterality: N/A;   • DILATATION AND CURETTAGE     • FACIAL LACERATIONS REPAIR     • HYSTEROSCOPY     • MOUTH SURGERY     • WISDOM TOOTH EXTRACTION         Family History:  Family History   Problem Relation Age of Onset   • Depression Mother    • Hypothyroidism Mother    • Thyroid disease Mother    • Colon polyps Mother    • COPD Maternal Grandmother    • Breast cancer Maternal Grandmother    • Hyperlipidemia Father    • Post-traumatic stress disorder Father    • No Known Problems Sister    • Alcohol  abuse Paternal Grandfather    • Cancer Paternal Grandmother    • Colon polyps Maternal Grandfather        Social History:   reports that she quit smoking about 2 years ago. Her smoking use included cigarettes. She started smoking about 14 years ago. She has a 3.00 pack-year smoking history. She has never used smokeless tobacco. She reports current drug use. Drugs: Benzodiazepines, Heroin, Marijuana, and Methamphetamines. She reports that she does not drink alcohol.    Medications:   Prior to Admission medications    Medication Sig Start Date End Date Taking? Authorizing Provider   ALBUTEROL SULFATE IN Inhale 2 puffs Every 4 (Four) Hours As Needed.   Yes Kaya Lux MD   amphetamine-dextroamphetamine (Adderall) 30 MG tablet Take 30 mg by mouth 2 (Two) Times a Day. 12/14/20  Yes Kaya Lux MD   buprenorphine-naloxone (SUBOXONE) 8-2 MG per SL tablet Place 1 tablet under the tongue 2 (two) times a day. 3/5/20  Yes Kaya Lux MD   lubiprostone (Amitiza) 24 MCG capsule Take 1 capsule by mouth 2 (Two) Times a Day With Meals. 8/25/21  Yes Juan Johnson MD   norethindrone-ethinyl estradiol (Ortho-Novum 1/35, 28,) 1-35 MG-MCG per tablet Take 1 tablet by mouth Daily. 7/13/21 7/13/22 Yes Cookie Villa MD   Naloxegol Oxalate (Movantik) 25 MG tablet Take 1 tablet by mouth Every Morning. 11/4/21   Juan Johnson MD   acetaminophen (TYLENOL) 325 MG tablet Take 2 tablets by mouth Every 4 (Four) Hours As Needed for Mild or Moderate Pain. 6/30/21 11/4/21  Cookie Villa MD   ibuprofen (ADVIL,MOTRIN) 600 MG tablet Take 1 tablet by mouth Every 6 (Six) Hours As Needed for Mild or Moderate Pain **Take with food** 6/30/21 11/4/21  Cookie Villa MD   mineral oil liquid Take 10 mL by mouth Daily.  11/4/21  Kaya Lux MD   Naloxegol Oxalate (MOVANTIK) 12.5 MG tablet Take 1 tablet by mouth Every Morning. 8/10/21 11/4/21  Juan Johnson MD  "  Naloxegol Oxalate (MOVANTIK) 12.5 MG tablet Take 1 tablet by mouth Every Morning. 11/4/21 11/4/21  Juan Johnson MD       Allergies:  Nickel and Penicillins    ROS:    Review of Systems   Constitutional: Negative for activity change, appetite change, chills, diaphoresis, fatigue, fever and unexpected weight change.   HENT: Negative for sore throat and trouble swallowing.    Respiratory: Negative for shortness of breath.    Gastrointestinal: Positive for constipation. Negative for abdominal distention, abdominal pain, anal bleeding, diarrhea, nausea, rectal pain and vomiting.   Endocrine: Negative for polydipsia, polyphagia and polyuria.   Genitourinary: Negative for difficulty urinating.   Musculoskeletal: Negative for arthralgias.   Skin: Negative for pallor.   Allergic/Immunologic: Negative for food allergies.   Neurological: Negative for weakness and light-headedness.   Psychiatric/Behavioral: Negative for behavioral problems.     Objective     Blood pressure 90/60, pulse 110, height 165.1 cm (65\"), weight 49 kg (108 lb), not currently breastfeeding.    Physical Exam  Constitutional:       General: She is not in acute distress.     Appearance: She is well-developed. She is not diaphoretic.   HENT:      Head: Normocephalic and atraumatic.   Cardiovascular:      Rate and Rhythm: Normal rate and regular rhythm.      Heart sounds: Normal heart sounds. No murmur heard.  No friction rub. No gallop.    Pulmonary:      Effort: No respiratory distress.      Breath sounds: Normal breath sounds. No wheezing or rales.   Chest:      Chest wall: No tenderness.   Abdominal:      General: Bowel sounds are normal. There is no distension.      Palpations: Abdomen is soft. There is no mass.      Tenderness: There is no abdominal tenderness. There is no guarding or rebound.      Hernia: No hernia is present.   Musculoskeletal:         General: Normal range of motion.   Skin:     General: Skin is warm and dry.      Coloration: " Skin is not pale.      Findings: No erythema or rash.   Neurological:      Mental Status: She is alert and oriented to person, place, and time.   Psychiatric:         Behavior: Behavior normal.         Thought Content: Thought content normal.         Judgment: Judgment normal.          Assessment/Plan   Diagnoses and all orders for this visit:    1. Chronic idiopathic constipation (Primary)    2. Drug-induced constipation    Other orders  -     Discontinue: Naloxegol Oxalate (MOVANTIK) 12.5 MG tablet; Take 1 tablet by mouth Every Morning.  Dispense: 30 tablet; Refill: 5  -     Naloxegol Oxalate (Movantik) 25 MG tablet; Take 1 tablet by mouth Every Morning.  Dispense: 30 tablet; Refill: 5        * Surgery not found *     Diagnosis Plan   1. Chronic idiopathic constipation     2. Drug-induced constipation         Anticipated Surgical Procedure:  No orders of the defined types were placed in this encounter.      The risks, benefits, and alternatives of this procedure have been discussed with the patient or the responsible party- the patient understands and agrees to proceed.

## 2021-11-05 ENCOUNTER — TELEPHONE (OUTPATIENT)
Dept: GASTROENTEROLOGY | Facility: CLINIC | Age: 26
End: 2021-11-05

## 2021-11-05 NOTE — TELEPHONE ENCOUNTER
11/05/2021, 1411 - [facility] [place], [place] telephoned per this staff member [phone].  Spoke with PharmacistMathew.  Pharmacist made aware Prior Authorization obtained for prescription medication Movantik 25 MG Tablets.  Per Pharmacist, patient co-pay $0.00.    11/05/2021, 1413 - Patient telephoned per this staff member [phone] with notification Prior Authorization approval obtained for prescription medication Movantik 25 MG Tablets.  Patient made aware [facility], [place], [place] notified of Prior Authorization approval with PharmacistMathew, stating patient co-pay $0.00.  Patient verbalized understanding.

## 2022-04-27 ENCOUNTER — TELEPHONE (OUTPATIENT)
Dept: OBSTETRICS AND GYNECOLOGY | Facility: CLINIC | Age: 27
End: 2022-04-27

## 2022-04-27 DIAGNOSIS — Z97.5 BREAKTHROUGH BLEEDING ON NEXPLANON: ICD-10-CM

## 2022-04-27 DIAGNOSIS — N92.1 BREAKTHROUGH BLEEDING ON NEXPLANON: ICD-10-CM

## 2022-04-27 RX ORDER — NORETHINDRONE AND ETHINYL ESTRADIOL 1 MG-35MCG
1 KIT ORAL DAILY
Qty: 28 TABLET | Refills: 12 | Status: SHIPPED | OUTPATIENT
Start: 2022-04-27 | End: 2023-01-25

## 2022-04-27 NOTE — TELEPHONE ENCOUNTER
PATIENT CALLED AND IS NEEDING REFILLS ON HER norethindrone-ethinyl estradiol (Ortho-Novum 1/35, 28,) 1-35 MG-MCG per tablet SENT TO [facility] IN [place]. HER NUMBER TO CALL BACK [phone].          THANK YOU,        RIVKA

## 2023-01-25 DIAGNOSIS — N92.1 BREAKTHROUGH BLEEDING ON NEXPLANON: ICD-10-CM

## 2023-01-25 DIAGNOSIS — Z97.5 BREAKTHROUGH BLEEDING ON NEXPLANON: ICD-10-CM

## 2023-01-25 RX ORDER — NORETHINDRONE AND ETHINYL ESTRADIOL 1 MG-35MCG
KIT ORAL
Qty: 28 TABLET | Refills: 0 | Status: SHIPPED | OUTPATIENT
Start: 2023-01-25 | End: 2023-02-20 | Stop reason: SDUPTHER

## 2023-02-15 DIAGNOSIS — N92.1 BREAKTHROUGH BLEEDING ON NEXPLANON: ICD-10-CM

## 2023-02-15 DIAGNOSIS — Z97.5 BREAKTHROUGH BLEEDING ON NEXPLANON: ICD-10-CM

## 2023-02-15 RX ORDER — NORETHINDRONE AND ETHINYL ESTRADIOL 1 MG-35MCG
KIT ORAL
Qty: 28 TABLET | Refills: 0 | OUTPATIENT
Start: 2023-02-15

## 2023-02-20 DIAGNOSIS — N92.1 BREAKTHROUGH BLEEDING ON NEXPLANON: ICD-10-CM

## 2023-02-20 DIAGNOSIS — Z97.5 BREAKTHROUGH BLEEDING ON NEXPLANON: ICD-10-CM

## 2023-02-20 RX ORDER — NORETHINDRONE AND ETHINYL ESTRADIOL 1 MG-35MCG
KIT ORAL
Qty: 28 TABLET | Refills: 11 | Status: SHIPPED | OUTPATIENT
Start: 2023-02-20

## 2024-10-30 NOTE — PATIENT INSTRUCTIONS
October 30, 2024      Tanisha Giordano  [address]  [facility] [address]        To Whom It May Concern:    Tanisha Giordano was seen in our clinic. She may return to school without restrictions.      Sincerely,        Rosanna Lewis MD           Reduce Miralax to once a day. Hold a dose if you are having diarrhea or frequent loose stool.

## (undated) DEVICE — GLV SURG SIGNATURE ESSENTIAL PF LTX SZ6

## (undated) DEVICE — GLV SURG SIGNATURE ESSENTIAL PF LTX SZ6.5

## (undated) DEVICE — STERILE POLYISOPRENE POWDER-FREE SURGICAL GLOVES WITH EMOLLIENT COATING: Brand: PROTEXIS

## (undated) DEVICE — CYSTO/BLADDER IRRIGATION SET, REGULATING CLAMP

## (undated) DEVICE — GOWN,ECLIPSE,FABRIC-REINFORCED,X-LARGE: Brand: MEDLINE

## (undated) DEVICE — CANN SMPL SOFTECH BIFLO ETCO2 A/M 7FT

## (undated) DEVICE — GLV SURG SENSICARE PI ORTHO PF SZ7 LF STRL

## (undated) DEVICE — TUBING, SUCTION, 3/16" X 6', STRAIGHT: Brand: MEDLINE

## (undated) DEVICE — GLV SURG SENSICARE POLYISPRN W/ALOE PF LF 6 GRN STRL

## (undated) DEVICE — CATHETER,URETHRAL,REDRUBBER,STRL,16FR: Brand: MEDLINE

## (undated) DEVICE — SINGLE-USE BIOPSY FORCEPS: Brand: RADIAL JAW 4

## (undated) DEVICE — CP SLF SEAL HYSTERSCOPE 2MM/HL

## (undated) DEVICE — SHEET,DRAPE,UNDERBUTTOCK,GRAD POUCH,PORT: Brand: MEDLINE